# Patient Record
Sex: FEMALE | Race: BLACK OR AFRICAN AMERICAN | Employment: UNEMPLOYED | URBAN - METROPOLITAN AREA
[De-identification: names, ages, dates, MRNs, and addresses within clinical notes are randomized per-mention and may not be internally consistent; named-entity substitution may affect disease eponyms.]

---

## 2021-03-14 ENCOUNTER — HOSPITAL ENCOUNTER (EMERGENCY)
Age: 26
Discharge: HOME | End: 2021-03-14
Attending: EMERGENCY MEDICINE
Payer: COMMERCIAL

## 2021-03-14 ENCOUNTER — APPOINTMENT (OUTPATIENT)
Dept: CT IMAGING | Age: 26
End: 2021-03-14
Attending: EMERGENCY MEDICINE
Payer: COMMERCIAL

## 2021-03-14 VITALS
SYSTOLIC BLOOD PRESSURE: 127 MMHG | HEART RATE: 90 BPM | DIASTOLIC BLOOD PRESSURE: 83 MMHG | BODY MASS INDEX: 29 KG/M2 | RESPIRATION RATE: 16 BRPM | TEMPERATURE: 97.4 F | OXYGEN SATURATION: 100 % | HEIGHT: 67 IN | WEIGHT: 184.75 LBS

## 2021-03-14 DIAGNOSIS — F43.21 GRIEF REACTION: Primary | ICD-10-CM

## 2021-03-14 LAB
ALBUMIN SERPL-MCNC: 3.9 G/DL (ref 3.5–5)
ALBUMIN/GLOB SERPL: 1 {RATIO} (ref 1.1–2.2)
ALP SERPL-CCNC: 78 U/L (ref 45–117)
ALT SERPL-CCNC: 14 U/L (ref 12–78)
AMPHET UR QL SCN: NEGATIVE
ANION GAP SERPL CALC-SCNC: 3 MMOL/L (ref 5–15)
APPEARANCE UR: CLEAR
AST SERPL-CCNC: 5 U/L (ref 15–37)
BACTERIA URNS QL MICRO: NEGATIVE /HPF
BARBITURATES UR QL SCN: NEGATIVE
BASOPHILS # BLD: 0 K/UL (ref 0–0.1)
BASOPHILS NFR BLD: 0 % (ref 0–1)
BENZODIAZ UR QL: NEGATIVE
BILIRUB SERPL-MCNC: 0.7 MG/DL (ref 0.2–1)
BILIRUB UR QL: NEGATIVE
BUN SERPL-MCNC: 7 MG/DL (ref 6–20)
BUN/CREAT SERPL: 9 (ref 12–20)
CALCIUM SERPL-MCNC: 9.5 MG/DL (ref 8.5–10.1)
CANNABINOIDS UR QL SCN: NEGATIVE
CHLORIDE SERPL-SCNC: 106 MMOL/L (ref 97–108)
CO2 SERPL-SCNC: 29 MMOL/L (ref 21–32)
COCAINE UR QL SCN: NEGATIVE
COLOR UR: ABNORMAL
COMMENT, HOLDF: NORMAL
CREAT SERPL-MCNC: 0.8 MG/DL (ref 0.55–1.02)
DIFFERENTIAL METHOD BLD: NORMAL
DRUG SCRN COMMENT,DRGCM: NORMAL
EOSINOPHIL # BLD: 0 K/UL (ref 0–0.4)
EOSINOPHIL NFR BLD: 1 % (ref 0–7)
EPITH CASTS URNS QL MICRO: ABNORMAL /LPF
ERYTHROCYTE [DISTWIDTH] IN BLOOD BY AUTOMATED COUNT: 11.9 % (ref 11.5–14.5)
ETHANOL SERPL-MCNC: <10 MG/DL
GLOBULIN SER CALC-MCNC: 3.9 G/DL (ref 2–4)
GLUCOSE SERPL-MCNC: 79 MG/DL (ref 65–100)
GLUCOSE UR STRIP.AUTO-MCNC: NEGATIVE MG/DL
HCG SERPL QL: NEGATIVE
HCT VFR BLD AUTO: 37.5 % (ref 35–47)
HGB BLD-MCNC: 12.8 G/DL (ref 11.5–16)
HGB UR QL STRIP: NEGATIVE
HYALINE CASTS URNS QL MICRO: ABNORMAL /LPF (ref 0–5)
IMM GRANULOCYTES # BLD AUTO: 0 K/UL (ref 0–0.04)
IMM GRANULOCYTES NFR BLD AUTO: 0 % (ref 0–0.5)
KETONES UR QL STRIP.AUTO: 80 MG/DL
LEUKOCYTE ESTERASE UR QL STRIP.AUTO: NEGATIVE
LYMPHOCYTES # BLD: 1.9 K/UL (ref 0.8–3.5)
LYMPHOCYTES NFR BLD: 24 % (ref 12–49)
MCH RBC QN AUTO: 30.5 PG (ref 26–34)
MCHC RBC AUTO-ENTMCNC: 34.1 G/DL (ref 30–36.5)
MCV RBC AUTO: 89.5 FL (ref 80–99)
METHADONE UR QL: NEGATIVE
MONOCYTES # BLD: 0.8 K/UL (ref 0–1)
MONOCYTES NFR BLD: 10 % (ref 5–13)
NEUTS SEG # BLD: 5.2 K/UL (ref 1.8–8)
NEUTS SEG NFR BLD: 65 % (ref 32–75)
NITRITE UR QL STRIP.AUTO: NEGATIVE
NRBC # BLD: 0 K/UL (ref 0–0.01)
NRBC BLD-RTO: 0 PER 100 WBC
OPIATES UR QL: NEGATIVE
PCP UR QL: NEGATIVE
PH UR STRIP: 6 [PH] (ref 5–8)
PLATELET # BLD AUTO: 296 K/UL (ref 150–400)
PMV BLD AUTO: 9.7 FL (ref 8.9–12.9)
POTASSIUM SERPL-SCNC: 4.5 MMOL/L (ref 3.5–5.1)
PROT SERPL-MCNC: 7.8 G/DL (ref 6.4–8.2)
PROT UR STRIP-MCNC: 100 MG/DL
RBC # BLD AUTO: 4.19 M/UL (ref 3.8–5.2)
RBC #/AREA URNS HPF: ABNORMAL /HPF (ref 0–5)
SAMPLES BEING HELD,HOLD: NORMAL
SODIUM SERPL-SCNC: 138 MMOL/L (ref 136–145)
SP GR UR REFRACTOMETRY: 1.02 (ref 1–1.03)
TSH SERPL DL<=0.05 MIU/L-ACNC: 0.84 UIU/ML (ref 0.36–3.74)
UA: UC IF INDICATED,UAUC: ABNORMAL
UROBILINOGEN UR QL STRIP.AUTO: 1 EU/DL (ref 0.2–1)
WBC # BLD AUTO: 7.9 K/UL (ref 3.6–11)
WBC URNS QL MICRO: ABNORMAL /HPF (ref 0–4)

## 2021-03-14 PROCEDURE — 90791 PSYCH DIAGNOSTIC EVALUATION: CPT

## 2021-03-14 PROCEDURE — 82077 ASSAY SPEC XCP UR&BREATH IA: CPT

## 2021-03-14 PROCEDURE — 51701 INSERT BLADDER CATHETER: CPT

## 2021-03-14 PROCEDURE — 81001 URINALYSIS AUTO W/SCOPE: CPT

## 2021-03-14 PROCEDURE — 84703 CHORIONIC GONADOTROPIN ASSAY: CPT

## 2021-03-14 PROCEDURE — 80053 COMPREHEN METABOLIC PANEL: CPT

## 2021-03-14 PROCEDURE — 70450 CT HEAD/BRAIN W/O DYE: CPT

## 2021-03-14 PROCEDURE — 80307 DRUG TEST PRSMV CHEM ANLYZR: CPT

## 2021-03-14 PROCEDURE — 36415 COLL VENOUS BLD VENIPUNCTURE: CPT

## 2021-03-14 PROCEDURE — 84443 ASSAY THYROID STIM HORMONE: CPT

## 2021-03-14 PROCEDURE — 85025 COMPLETE CBC W/AUTO DIFF WBC: CPT

## 2021-03-14 PROCEDURE — 99284 EMERGENCY DEPT VISIT MOD MDM: CPT

## 2021-03-14 NOTE — ED PROVIDER NOTES
EMERGENCY DEPARTMENT HISTORY AND PHYSICAL EXAM      Date: 3/14/2021  Patient Name: Kole Cohn    History of Presenting Illness     Chief Complaint   Patient presents with    Urinary Pain     mother with her she thinks she has a urinary tract infection; no appetite and odor with urine; pt recently taken off zoloft.  Mental Health Problem     pt has new allergies to medicatons for depression; pt is not speaking in triage; History Provided By: Patient's Mother    HPI: Kole Cohn, 31 y.o. female with PMHx significant for anxiety, depression, who presents with a chief complaint of worsening depression as well as concerns for UTI and malodorous urine. Patient's mother reports that the patient has been on escitalopram and sertraline previously for her symptoms but has had \"reactions. \"  Mother describes these as insomnia. She stopped taking Zoloft this past week and since then has had intermittent periods that are getting more frequent where she refuses to speak. She is not eating or drinking. On my evaluation, patient is alert but refusing to answer any questions or follow commands. Additional history review of systems are limited by psychiatric condition. PCP: Unknown, Provider        Past History     Past Medical History:  Past Medical History:   Diagnosis Date    Psychiatric disorder     anxiety, depression     Past Surgical History:  History reviewed. No pertinent surgical history. Family History:  History reviewed. No pertinent family history. Social History:  Social History     Tobacco Use    Smoking status: Never Smoker   Substance Use Topics    Alcohol use: Not Currently    Drug use: Never     Allergies:   Allergies   Allergen Reactions    Escitalopram Other (comments)     Muscle pain and heart palpitations    Zoloft [Sertraline] Other (comments)     insomnia     Review of Systems   Review of Systems   Unable to perform ROS: Psychiatric disorder     Physical Exam   Physical Exam  Vitals signs and nursing note reviewed. Constitutional:       General: She is not in acute distress. Appearance: She is well-developed. HENT:      Head: Normocephalic and atraumatic. Eyes:      Conjunctiva/sclera: Conjunctivae normal.      Pupils: Pupils are equal, round, and reactive to light. Neck:      Musculoskeletal: Normal range of motion. Cardiovascular:      Rate and Rhythm: Normal rate and regular rhythm. Pulmonary:      Effort: Pulmonary effort is normal. No respiratory distress. Breath sounds: Normal breath sounds. No stridor. Abdominal:      General: There is no distension. Palpations: Abdomen is soft. Tenderness: There is no abdominal tenderness. Musculoskeletal: Normal range of motion. Skin:     General: Skin is warm and dry. Neurological:      Mental Status: She is alert. Comments: Not speaking  Refusing to follow any commands  Ambulated to her room in the ED       Diagnostic Study Results   Labs -     Recent Results (from the past 12 hour(s))   CBC WITH AUTOMATED DIFF    Collection Time: 03/14/21 12:17 PM   Result Value Ref Range    WBC 7.9 3.6 - 11.0 K/uL    RBC 4.19 3.80 - 5.20 M/uL    HGB 12.8 11.5 - 16.0 g/dL    HCT 37.5 35.0 - 47.0 %    MCV 89.5 80.0 - 99.0 FL    MCH 30.5 26.0 - 34.0 PG    MCHC 34.1 30.0 - 36.5 g/dL    RDW 11.9 11.5 - 14.5 %    PLATELET 423 739 - 411 K/uL    MPV 9.7 8.9 - 12.9 FL    NRBC 0.0 0  WBC    ABSOLUTE NRBC 0.00 0.00 - 0.01 K/uL    NEUTROPHILS 65 32 - 75 %    LYMPHOCYTES 24 12 - 49 %    MONOCYTES 10 5 - 13 %    EOSINOPHILS 1 0 - 7 %    BASOPHILS 0 0 - 1 %    IMMATURE GRANULOCYTES 0 0.0 - 0.5 %    ABS. NEUTROPHILS 5.2 1.8 - 8.0 K/UL    ABS. LYMPHOCYTES 1.9 0.8 - 3.5 K/UL    ABS. MONOCYTES 0.8 0.0 - 1.0 K/UL    ABS. EOSINOPHILS 0.0 0.0 - 0.4 K/UL    ABS. BASOPHILS 0.0 0.0 - 0.1 K/UL    ABS. IMM.  GRANS. 0.0 0.00 - 0.04 K/UL    DF AUTOMATED     METABOLIC PANEL, COMPREHENSIVE    Collection Time: 03/14/21 12:17 PM Result Value Ref Range    Sodium 138 136 - 145 mmol/L    Potassium 4.5 3.5 - 5.1 mmol/L    Chloride 106 97 - 108 mmol/L    CO2 29 21 - 32 mmol/L    Anion gap 3 (L) 5 - 15 mmol/L    Glucose 79 65 - 100 mg/dL    BUN 7 6 - 20 MG/DL    Creatinine 0.80 0.55 - 1.02 MG/DL    BUN/Creatinine ratio 9 (L) 12 - 20      GFR est AA >60 >60 ml/min/1.73m2    GFR est non-AA >60 >60 ml/min/1.73m2    Calcium 9.5 8.5 - 10.1 MG/DL    Bilirubin, total 0.7 0.2 - 1.0 MG/DL    ALT (SGPT) 14 12 - 78 U/L    AST (SGOT) 5 (L) 15 - 37 U/L    Alk. phosphatase 78 45 - 117 U/L    Protein, total 7.8 6.4 - 8.2 g/dL    Albumin 3.9 3.5 - 5.0 g/dL    Globulin 3.9 2.0 - 4.0 g/dL    A-G Ratio 1.0 (L) 1.1 - 2.2     ETHYL ALCOHOL    Collection Time: 03/14/21 12:17 PM   Result Value Ref Range    ALCOHOL(ETHYL),SERUM <10 <10 MG/DL   SAMPLES BEING HELD    Collection Time: 03/14/21 12:17 PM   Result Value Ref Range    SAMPLES BEING HELD PST     COMMENT        Add-on orders for these samples will be processed based on acceptable specimen integrity and analyte stability, which may vary by analyte.    TSH 3RD GENERATION    Collection Time: 03/14/21 12:17 PM   Result Value Ref Range    TSH 0.84 0.36 - 3.74 uIU/mL   HCG QL SERUM    Collection Time: 03/14/21 12:17 PM   Result Value Ref Range    HCG, Ql. Negative NEG     URINALYSIS W/ REFLEX CULTURE    Collection Time: 03/14/21  3:57 PM    Specimen: Urine   Result Value Ref Range    Color YELLOW/STRAW      Appearance CLEAR CLEAR      Specific gravity 1.025 1.003 - 1.030      pH (UA) 6.0 5.0 - 8.0      Protein 100 (A) NEG mg/dL    Glucose Negative NEG mg/dL    Ketone 80 (A) NEG mg/dL    Bilirubin Negative NEG      Blood Negative NEG      Urobilinogen 1.0 0.2 - 1.0 EU/dL    Nitrites Negative NEG      Leukocyte Esterase Negative NEG      WBC 0-4 0 - 4 /hpf    RBC 0-5 0 - 5 /hpf    Epithelial cells FEW FEW /lpf    Bacteria Negative NEG /hpf    UA:UC IF INDICATED CULTURE NOT INDICATED BY UA RESULT CNI      Hyaline cast 0-2 0 - 5 /lpf   DRUG SCREEN, URINE    Collection Time: 03/14/21  3:57 PM   Result Value Ref Range    AMPHETAMINES Negative NEG      BARBITURATES Negative NEG      BENZODIAZEPINES Negative NEG      COCAINE Negative NEG      METHADONE Negative NEG      OPIATES Negative NEG      PCP(PHENCYCLIDINE) Negative NEG      THC (TH-CANNABINOL) Negative NEG      Drug screen comment (NOTE)        Radiologic Studies -   CT HEAD WO CONT   Final Result   No acute abnormality. Ct Head Wo Cont    Result Date: 3/14/2021  No acute abnormality. Medical Decision Making   I am the first provider for this patient. I reviewed the vital signs, available nursing notes, past medical history, past surgical history, family history and social history. Vital Signs-Reviewed the patient's vital signs. Patient Vitals for the past 12 hrs:   Temp Pulse Resp BP SpO2   03/14/21 1300    127/83 100 %   03/14/21 1230    128/82 100 %   03/14/21 1215    125/83 100 %   03/14/21 1200    121/75 100 %   03/14/21 1145    125/83 100 %   03/14/21 1051 97.4 °F (36.3 °C) 90 16 113/82 100 %       Pulse Oximetry Analysis - 100% on ra    Records Reviewed: Nursing Notes and Old Medical Records    Provider Notes (Medical Decision Making):   Patient presents with concerns for UTI, depression. Currently not speaking but hemodynamically stable. Was ambulatory into the emergency department. I suspect the reason for her lack of speech is related to significant depression. Will check basic lab work, urinalysis, urine drug screen, CT head. Will have [facility] see the patient    ED Course:   Initial assessment performed. The patients presenting problems have been discussed, and they are in agreement with the care plan formulated and outlined with them. I have encouraged them to ask questions as they arise throughout their visit. ED Course as of Mar 14 1740   David Sernaирина Mar 14, 2021   1717 Mom would like to take patient home. Safety plan made. Aliyah has seen. Will d/c    [SADI]      ED Course User Index  Hailey Galindo MD     Mom states that she is able to get the patient to eat and take pills. They are hoping that she will \"snap out of it. \"  Myself as well as Luis Eduardo Cuenca from [place] discussed that the patient would likely benefit from inpatient hospitalization. Mom states that she would like to take her home at this time and if she does not improve in the next 2 days she will bring her back to the hospital.    Procedures:  Procedures    Critical Care:  none    Disposition:  discharge    PLAN:  1. There are no discharge medications for this patient. 2.   Follow-up Information     Follow up With Specialties Details Why Contact Info    your PCP  Schedule an appointment as soon as possible for a visit       [facility] EMERGENCY DEPT Emergency Medicine  As needed, If symptoms worsen [address]  [address] [facility]  [phone]        Return to ED if worse     Diagnosis     Clinical Impression:   1. Grief reaction            Please note that this dictation was completed with [employer], the computer voice recognition software. Quite often unanticipated grammatical, syntax, homophones, and other interpretive errors are inadvertently transcribed by the computer software. Please disregard these errors.   Please excuse any errors that have escaped final proofreading

## 2021-03-14 NOTE — ED NOTES
Pt arrived via Private vehicle accompanied by mother. Pt responds to pain but will not answer staff questions or talk. Mother states pt has Mental augustin history with anxiety and the family has recent suffered a lost of 2 family members. Pt was placed on Zoloft and was having insomnia and increased paige when she was taken off last week. Pt's mother also reports pt has recently showed signs of decreased eating and drinking and voiding. Mother states pt had foul smelling urine and she thinks it might be related to the non-verbal behavior. no

## 2021-03-14 NOTE — ED NOTES
Spoke with [employer]. Mom assisted pt to bathroom to attempt to obtain UA. 1540 pt still unable to void. RN straight cathed pt with tech at bedside.

## 2021-03-14 NOTE — [facility] NOTE
Comprehensive Assessment Form Part 1 Section I - Disposition Axis I - Major Depressive Disorder with Psychotic Features, Premenstrual dysphoric disorder (by history) Axis II - Deferred Axis III - None Axis IV - Grief/loss Axis V - 40 The Medical Doctor to Psychiatrist conference was not completed. The Medical Doctor is in agreement with Psychiatrist disposition because of (reason) patient's other is willing to care for her while seeing if outpatient psychiatry can help patient. The plan is discharge and follow up tomorrow with psychiatric NP at [facility]. The on-call Psychiatrist consulted was NP Jumana Solis. The admitting Psychiatrist will be Dr. Raman Anthony. The admitting Diagnosis is NA. The Payor source is self. Section II - Integrated Summary Summary:  Patient is a 31year old female seen face to face in the ER with her mother. Of note, mother provided all information, as patient did not speak at any point during the assessment. Nursing staff reported she had spoken on 2 occasions since being in the ER, saying \"no\" when she felt the needle when they rachael her blood, and saying slightly more in opposition when she was straight cathed for urine. Patient's mother reported that patient has had 3 recent deaths of people she was close with. Her 29year old sister-in-law  in January after a 16 month poe with breast cancer. A week before that a dance teacher of her's passed away at age 29. A month before that a dancer friend of her's  in her sleep at the age of 24. 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 This did not happen due to the COVID-19 pandemic. 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 She has been seeing a therapist and psychiatric NP through [facility]. She took Lexapro for a while, but it caused her to have muscle pain and heart palpitations, so she stopped it.   About a month ago she was put on Zoloft, but after about a week she started having terrible insomnia and almost manic behavior, so she was told to stop it. Patient began sleeping again, but is now not eating or drinking without her mother holding something up to her mouth. She stopped speaking for the most part about 2 days ago. Her mother has been giving her water, juice, and ensure. Patient's mother reported she has not been handling her grief well. She has a history of becoming especially low during her period, and has been diagnosed with PMDD. She reported after he period stopped this last month her mood did not get better like it usually does. She said her mood has been \"low, low\" for the past 2 weeks. Patient presents as staring and catatonic. Her mother is able to get her to follow some directions. Her mother reported she was hospitalized when she was 11 at [facility] because Iva Gilmore was psychotic or something because she cut her hair. \"  She reported this was a very traumatizing situation, as the hospital tried to send her to a program out of state and they had to go to court to stop this from happening. Her mother does not want her hospitalized psychiatrically at this time. She would like to take patient home and call her psychiatric NP at [facility] tomorrow to see if they will prescribe something to try on an outpatient basis. She said if she does not respond by Thursday/Friday or she gets worse she will take her to an ER to be psychiatrically admitted. Patient has no history of suicide attempts or aggression. Her blood work was normal and her mother appears to be doing a good job of making sure she gets some nutrition. Patient's father is home all day and monitors her when her mother is at work. Discussed case with on call NP and she agreed for patient to be discharged to attempt a lesser restrictive option before hospitalizing her. The patient has demonstrated mental capacity to provide informed consent. The information is given by the patient's mother. The Chief Complaint is mental health problem. The Precipitant Factors are multiple recent deaths of young people who she was close to. Previous Hospitalizations: yes The patient has not previously been in restraints. Current Psychiatrist is NP Kurt Paget and therapist's name is also Hanna Mays, both with [address] Lethality Assessment: 
 
The potential for suicide is not noted. The potential for homicide is not noted. The patient has not been a perpetrator of sexual or physical abuse. There are not pending charges. The patient is not felt to be at risk for self harm or harm to others, but is at risk due to inability to care for herself. The attending nurse was advised the patient needs supervision. Section III - Psychosocial 
The patient's overall mood and attitude is withdrawn. Feelings of helplessness and hopelessness are not observed. Generalized anxiety is not observed. Panic is not observed. Phobias are not observed. Obsessive compulsive tendencies are not observed. Section IV - Mental Status Exam 
The patient's appearance shows no evidence of impairment. The patient's behavior shows poor eye contact. The patient is orientation is unknown as patient did not speak. The patient's speech is impoverished. She did not speak at all. The patient's mood is withdrawn. The range of affect is flat. The patient's thought content was unable to be assessed. The thought process was unable to be assessed. The patient's perception was unable to be assessed. The patient's memory was unable to be assessed. The patient's appetite is decreased and shows signs of weight loss. The patient's sleep shows no evidence of impairment. The patient's insight and judgement are unable to be assessed. Section V - Substance Abuse The patient is not using substances. Section VI - Living Arrangements The patient is single.   The patient lives with her parents. The patient has no children. The patient does plan to return home upon discharge. The patient does not have legal issues pending. The patient's source of income comes from family. Scientologist and cultural practices have not been voiced at this time. The patient's greatest support comes from her parents and this person will be involved with the treatment. The patient has been in an event described as horrible or outside the realm of ordinary life experience either currently or in the past. 
The patient has not been a victim of sexual/physical abuse. Section VII - Other Areas of Clinical Concern The highest grade achieved is college graduate with the overall quality of school experience being described as not assessed. The patient is currently unemployed and speaks [place] as a primary language. The patient has no communication impairments affecting communication. The patient's preference for learning can be described as: can read and write adequately.   The patient's hearing is normal.  The patient's vision is normal. 
 
 
Nano Quiroz MA

## 2023-01-01 ENCOUNTER — ANESTHESIA EVENT (OUTPATIENT)
Dept: SURGERY | Age: 28
DRG: 951 | End: 2023-01-01
Payer: COMMERCIAL

## 2023-01-01 ENCOUNTER — APPOINTMENT (OUTPATIENT)
Dept: NON INVASIVE DIAGNOSTICS | Age: 28
DRG: 059 | End: 2023-01-01
Attending: NURSE PRACTITIONER
Payer: MEDICAID

## 2023-01-01 ENCOUNTER — APPOINTMENT (OUTPATIENT)
Dept: CT IMAGING | Age: 28
DRG: 059 | End: 2023-01-01
Payer: MEDICAID

## 2023-01-01 ENCOUNTER — APPOINTMENT (OUTPATIENT)
Dept: NUCLEAR MEDICINE | Age: 28
DRG: 059 | End: 2023-01-01
Attending: HOSPITALIST
Payer: MEDICAID

## 2023-01-01 ENCOUNTER — APPOINTMENT (OUTPATIENT)
Dept: GENERAL RADIOLOGY | Age: 28
DRG: 951 | End: 2023-01-01
Attending: INTERNAL MEDICINE
Payer: COMMERCIAL

## 2023-01-01 ENCOUNTER — APPOINTMENT (OUTPATIENT)
Dept: CT IMAGING | Age: 28
DRG: 059 | End: 2023-01-01
Attending: NURSE PRACTITIONER
Payer: MEDICAID

## 2023-01-01 ENCOUNTER — HOSPITAL ENCOUNTER (INPATIENT)
Age: 28
LOS: 6 days | DRG: 059 | End: 2023-01-17
Attending: STUDENT IN AN ORGANIZED HEALTH CARE EDUCATION/TRAINING PROGRAM | Admitting: HOSPITALIST
Payer: MEDICAID

## 2023-01-01 ENCOUNTER — APPOINTMENT (OUTPATIENT)
Dept: GENERAL RADIOLOGY | Age: 28
DRG: 059 | End: 2023-01-01
Attending: ANESTHESIOLOGY
Payer: MEDICAID

## 2023-01-01 ENCOUNTER — APPOINTMENT (OUTPATIENT)
Dept: CT IMAGING | Age: 28
DRG: 059 | End: 2023-01-01
Attending: STUDENT IN AN ORGANIZED HEALTH CARE EDUCATION/TRAINING PROGRAM
Payer: MEDICAID

## 2023-01-01 ENCOUNTER — HOSPITAL ENCOUNTER (INPATIENT)
Age: 28
LOS: 1 days | DRG: 951 | End: 2023-01-19
Attending: HOSPITALIST | Admitting: INTERNAL MEDICINE
Payer: COMMERCIAL

## 2023-01-01 ENCOUNTER — APPOINTMENT (OUTPATIENT)
Dept: NON INVASIVE DIAGNOSTICS | Age: 28
DRG: 059 | End: 2023-01-01
Attending: ANESTHESIOLOGY
Payer: MEDICAID

## 2023-01-01 ENCOUNTER — APPOINTMENT (OUTPATIENT)
Dept: GENERAL RADIOLOGY | Age: 28
DRG: 951 | End: 2023-01-01
Attending: HOSPITALIST
Payer: COMMERCIAL

## 2023-01-01 ENCOUNTER — APPOINTMENT (OUTPATIENT)
Dept: GENERAL RADIOLOGY | Age: 28
DRG: 059 | End: 2023-01-01
Attending: STUDENT IN AN ORGANIZED HEALTH CARE EDUCATION/TRAINING PROGRAM
Payer: MEDICAID

## 2023-01-01 ENCOUNTER — APPOINTMENT (OUTPATIENT)
Dept: GENERAL RADIOLOGY | Age: 28
DRG: 059 | End: 2023-01-01
Attending: NURSE PRACTITIONER
Payer: MEDICAID

## 2023-01-01 ENCOUNTER — ANESTHESIA (OUTPATIENT)
Dept: SURGERY | Age: 28
DRG: 951 | End: 2023-01-01
Payer: COMMERCIAL

## 2023-01-01 ENCOUNTER — APPOINTMENT (OUTPATIENT)
Dept: ULTRASOUND IMAGING | Age: 28
DRG: 059 | End: 2023-01-01
Attending: NURSE PRACTITIONER
Payer: MEDICAID

## 2023-01-01 ENCOUNTER — HOSPITAL ENCOUNTER (OUTPATIENT)
Age: 28
DRG: 059 | End: 2023-01-01
Attending: HOSPITALIST | Admitting: HOSPITALIST
Payer: MEDICAID

## 2023-01-01 ENCOUNTER — HOSPITAL ENCOUNTER (INPATIENT)
Dept: GENERAL RADIOLOGY | Age: 28
Discharge: HOME | DRG: 059 | End: 2023-01-17
Attending: HOSPITALIST
Payer: MEDICAID

## 2023-01-01 ENCOUNTER — HOSPITAL ENCOUNTER (INPATIENT)
Dept: GENERAL RADIOLOGY | Age: 28
Discharge: HOME | DRG: 059 | End: 2023-01-17
Payer: MEDICAID

## 2023-01-01 ENCOUNTER — APPOINTMENT (OUTPATIENT)
Dept: GENERAL RADIOLOGY | Age: 28
DRG: 059 | End: 2023-01-01
Payer: MEDICAID

## 2023-01-01 ENCOUNTER — HOSPITAL ENCOUNTER (INPATIENT)
Dept: NON INVASIVE DIAGNOSTICS | Age: 28
Discharge: HOME | DRG: 059 | End: 2023-01-17
Attending: HOSPITALIST
Payer: MEDICAID

## 2023-01-01 VITALS
TEMPERATURE: 97.5 F | SYSTOLIC BLOOD PRESSURE: 138 MMHG | RESPIRATION RATE: 16 BRPM | DIASTOLIC BLOOD PRESSURE: 93 MMHG | HEART RATE: 103 BPM | OXYGEN SATURATION: 100 %

## 2023-01-01 VITALS
SYSTOLIC BLOOD PRESSURE: 124 MMHG | WEIGHT: 185 LBS | DIASTOLIC BLOOD PRESSURE: 82 MMHG | BODY MASS INDEX: 29.03 KG/M2 | TEMPERATURE: 97.7 F | RESPIRATION RATE: 16 BRPM | HEIGHT: 67 IN | HEART RATE: 93 BPM | OXYGEN SATURATION: 101 %

## 2023-01-01 VITALS — SYSTOLIC BLOOD PRESSURE: 145 MMHG | WEIGHT: 185 LBS | BODY MASS INDEX: 28.98 KG/M2 | DIASTOLIC BLOOD PRESSURE: 85 MMHG

## 2023-01-01 DIAGNOSIS — I46.9 CARDIAC ARREST (HCC): ICD-10-CM

## 2023-01-01 DIAGNOSIS — G93.40 ENCEPHALOPATHY: Primary | ICD-10-CM

## 2023-01-01 DIAGNOSIS — G93.1 ANOXIC BRAIN INJURY (HCC): ICD-10-CM

## 2023-01-01 DIAGNOSIS — G25.3 MYOCLONUS: ICD-10-CM

## 2023-01-01 DIAGNOSIS — R07.9 CHEST PAIN: ICD-10-CM

## 2023-01-01 LAB
A1 AB SERPL QL: NORMAL
A1 AB SERPL QL: NORMAL
A2 CELLS,A2C: NORMAL
A2 CELLS,A2C: NORMAL
ABO + RH BLD: NORMAL
ADMINISTERED INITIALS, ADMINIT: NORMAL
ALBUMIN SERPL-MCNC: 1.9 G/DL (ref 3.5–5)
ALBUMIN SERPL-MCNC: 2 G/DL (ref 3.5–5)
ALBUMIN SERPL-MCNC: 2.1 G/DL (ref 3.5–5)
ALBUMIN SERPL-MCNC: 2.2 G/DL (ref 3.5–5)
ALBUMIN SERPL-MCNC: 2.4 G/DL (ref 3.5–5)
ALBUMIN SERPL-MCNC: 2.5 G/DL (ref 3.5–5)
ALBUMIN SERPL-MCNC: 2.8 G/DL (ref 3.5–5)
ALBUMIN SERPL-MCNC: 2.8 G/DL (ref 3.5–5)
ALBUMIN SERPL-MCNC: 2.9 G/DL (ref 3.5–5)
ALBUMIN SERPL-MCNC: 3.2 G/DL (ref 3.5–5)
ALBUMIN/GLOB SERPL: 0.7 (ref 1.1–2.2)
ALBUMIN/GLOB SERPL: 0.8 (ref 1.1–2.2)
ALBUMIN/GLOB SERPL: 0.8 (ref 1.1–2.2)
ALBUMIN/GLOB SERPL: 0.9 (ref 1.1–2.2)
ALBUMIN/GLOB SERPL: 1 (ref 1.1–2.2)
ALP SERPL-CCNC: 103 U/L (ref 45–117)
ALP SERPL-CCNC: 108 U/L (ref 45–117)
ALP SERPL-CCNC: 111 U/L (ref 45–117)
ALP SERPL-CCNC: 112 U/L (ref 45–117)
ALP SERPL-CCNC: 133 U/L (ref 45–117)
ALP SERPL-CCNC: 79 U/L (ref 45–117)
ALP SERPL-CCNC: 84 U/L (ref 45–117)
ALP SERPL-CCNC: 87 U/L (ref 45–117)
ALP SERPL-CCNC: 89 U/L (ref 45–117)
ALP SERPL-CCNC: 90 U/L (ref 45–117)
ALP SERPL-CCNC: 92 U/L (ref 45–117)
ALP SERPL-CCNC: 94 U/L (ref 45–117)
ALP SERPL-CCNC: 95 U/L (ref 45–117)
ALP SERPL-CCNC: 95 U/L (ref 45–117)
ALP SERPL-CCNC: 97 U/L (ref 45–117)
ALT SERPL-CCNC: 117 U/L (ref 12–78)
ALT SERPL-CCNC: 118 U/L (ref 12–78)
ALT SERPL-CCNC: 120 U/L (ref 12–78)
ALT SERPL-CCNC: 125 U/L (ref 12–78)
ALT SERPL-CCNC: 138 U/L (ref 12–78)
ALT SERPL-CCNC: 202 U/L (ref 12–78)
ALT SERPL-CCNC: 267 U/L (ref 12–78)
ALT SERPL-CCNC: 450 U/L (ref 12–78)
ALT SERPL-CCNC: 453 U/L (ref 12–78)
ALT SERPL-CCNC: 628 U/L (ref 12–78)
ALT SERPL-CCNC: 674 U/L (ref 12–78)
ALT SERPL-CCNC: 80 U/L (ref 12–78)
ALT SERPL-CCNC: 84 U/L (ref 12–78)
ALT SERPL-CCNC: 97 U/L (ref 12–78)
ALT SERPL-CCNC: 99 U/L (ref 12–78)
AMPHET UR QL SCN: NEGATIVE
ANION GAP SERPL CALC-SCNC: 10 MMOL/L (ref 5–15)
ANION GAP SERPL CALC-SCNC: 11 MMOL/L (ref 5–15)
ANION GAP SERPL CALC-SCNC: 15 MMOL/L (ref 5–15)
ANION GAP SERPL CALC-SCNC: 17 MMOL/L (ref 5–15)
ANION GAP SERPL CALC-SCNC: 25 MMOL/L (ref 5–15)
ANION GAP SERPL CALC-SCNC: 5 MMOL/L (ref 5–15)
ANION GAP SERPL CALC-SCNC: 6 MMOL/L (ref 5–15)
ANION GAP SERPL CALC-SCNC: 7 MMOL/L (ref 5–15)
ANION GAP SERPL CALC-SCNC: 8 MMOL/L (ref 5–15)
ANION GAP SERPL CALC-SCNC: 9 MMOL/L (ref 5–15)
APAP SERPL-MCNC: <2 UG/ML (ref 10–30)
APPEARANCE FLD: ABNORMAL
APPEARANCE UR: ABNORMAL
APPEARANCE UR: CLEAR
APPEARANCE UR: CLEAR
APTT PPP: 27.5 SEC (ref 22.1–31)
APTT PPP: 28.9 SEC (ref 22.1–31)
APTT PPP: 31.1 SEC (ref 22.1–31)
APTT PPP: 31.8 SEC (ref 22.1–31)
APTT PPP: 32.5 SEC (ref 22.1–31)
APTT PPP: 33.2 SEC (ref 22.1–31)
APTT PPP: 35 SEC (ref 22.1–31)
ARTERIAL PATENCY WRIST A: ABNORMAL
ARTERIAL PATENCY WRIST A: POSITIVE
AST SERPL-CCNC: 116 U/L (ref 15–37)
AST SERPL-CCNC: 134 U/L (ref 15–37)
AST SERPL-CCNC: 147 U/L (ref 15–37)
AST SERPL-CCNC: 280 U/L (ref 15–37)
AST SERPL-CCNC: 464 U/L (ref 15–37)
AST SERPL-CCNC: 495 U/L (ref 15–37)
AST SERPL-CCNC: 807 U/L (ref 15–37)
AST SERPL-CCNC: 808 U/L (ref 15–37)
AST SERPL-CCNC: 84 U/L (ref 15–37)
AST SERPL-CCNC: 85 U/L (ref 15–37)
AST SERPL-CCNC: 90 U/L (ref 15–37)
AST SERPL-CCNC: 92 U/L (ref 15–37)
AST SERPL-CCNC: 94 U/L (ref 15–37)
AST SERPL-CCNC: 96 U/L (ref 15–37)
AST SERPL-CCNC: 98 U/L (ref 15–37)
ATRIAL RATE: 119 BPM
ATRIAL RATE: 120 BPM
ATRIAL RATE: 134 BPM
ATRIAL RATE: 137 BPM
BACTERIA SPEC CULT: NORMAL
BARBITURATES UR QL SCN: NEGATIVE
BASE DEFICIT BLD-SCNC: 0.9 MMOL/L
BASE DEFICIT BLD-SCNC: 17.5 MMOL/L
BASE DEFICIT BLD-SCNC: 2.1 MMOL/L
BASE DEFICIT BLD-SCNC: 2.7 MMOL/L
BASE DEFICIT BLD-SCNC: 2.9 MMOL/L
BASE DEFICIT BLD-SCNC: 4.1 MMOL/L
BASE DEFICIT BLD-SCNC: 4.7 MMOL/L
BASE DEFICIT BLD-SCNC: 4.7 MMOL/L
BASE DEFICIT BLD-SCNC: 5 MMOL/L
BASE DEFICIT BLDA-SCNC: 1.6 MMOL/L
BASE DEFICIT BLDA-SCNC: 3.8 MMOL/L
BASE DEFICIT BLDA-SCNC: 4.2 MMOL/L
BASE DEFICIT BLDA-SCNC: 5.4 MMOL/L
BASE DEFICIT BLDA-SCNC: 6.1 MMOL/L
BASE DEFICIT BLDA-SCNC: 7.4 MMOL/L
BASE DEFICIT BLDA-SCNC: 8.4 MMOL/L
BASE DEFICIT BLDA-SCNC: 8.5 MMOL/L
BASE DEFICIT BLDV-SCNC: 10.1 MMOL/L
BASE DEFICIT BLDV-SCNC: 2.7 MMOL/L
BASOPHILS # BLD: 0 K/UL (ref 0–0.1)
BASOPHILS NFR BLD: 0 % (ref 0–1)
BDY SITE: ABNORMAL
BENZODIAZ UR QL: NEGATIVE
BILIRUB DIRECT SERPL-MCNC: 0.2 MG/DL (ref 0–0.2)
BILIRUB SERPL-MCNC: 0.4 MG/DL (ref 0.2–1)
BILIRUB SERPL-MCNC: 0.5 MG/DL (ref 0.2–1)
BILIRUB SERPL-MCNC: 0.6 MG/DL (ref 0.2–1)
BILIRUB SERPL-MCNC: 0.6 MG/DL (ref 0.2–1)
BILIRUB SERPL-MCNC: 0.7 MG/DL (ref 0.2–1)
BILIRUB SERPL-MCNC: 0.7 MG/DL (ref 0.2–1)
BILIRUB SERPL-MCNC: 0.8 MG/DL (ref 0.2–1)
BILIRUB SERPL-MCNC: 0.9 MG/DL (ref 0.2–1)
BILIRUB SERPL-MCNC: 1 MG/DL (ref 0.2–1)
BILIRUB SERPL-MCNC: 1.4 MG/DL (ref 0.2–1)
BILIRUB UR QL: NEGATIVE
BLD PROD TYP BPU: NORMAL
BLOOD GROUP ANTIBODIES SERPL: NORMAL
BLOOD GROUP ANTIBODIES SERPL: NORMAL
BNP SERPL-MCNC: 168 PG/ML
BNP SERPL-MCNC: 19 PG/ML
BPU ID: NORMAL
BUN SERPL-MCNC: 10 MG/DL (ref 6–20)
BUN SERPL-MCNC: 11 MG/DL (ref 6–20)
BUN SERPL-MCNC: 11 MG/DL (ref 6–20)
BUN SERPL-MCNC: 13 MG/DL (ref 6–20)
BUN SERPL-MCNC: 14 MG/DL (ref 6–20)
BUN SERPL-MCNC: 14 MG/DL (ref 6–20)
BUN SERPL-MCNC: 16 MG/DL (ref 6–20)
BUN SERPL-MCNC: 16 MG/DL (ref 6–20)
BUN SERPL-MCNC: 18 MG/DL (ref 6–20)
BUN SERPL-MCNC: 19 MG/DL (ref 6–20)
BUN SERPL-MCNC: 2 MG/DL (ref 6–20)
BUN SERPL-MCNC: 2 MG/DL (ref 6–20)
BUN SERPL-MCNC: 21 MG/DL (ref 6–20)
BUN SERPL-MCNC: 21 MG/DL (ref 6–20)
BUN SERPL-MCNC: 22 MG/DL (ref 6–20)
BUN SERPL-MCNC: 22 MG/DL (ref 6–20)
BUN SERPL-MCNC: 24 MG/DL (ref 6–20)
BUN SERPL-MCNC: 24 MG/DL (ref 6–20)
BUN SERPL-MCNC: 3 MG/DL (ref 6–20)
BUN SERPL-MCNC: 4 MG/DL (ref 6–20)
BUN SERPL-MCNC: 5 MG/DL (ref 6–20)
BUN SERPL-MCNC: 5 MG/DL (ref 6–20)
BUN SERPL-MCNC: 7 MG/DL (ref 6–20)
BUN SERPL-MCNC: 8 MG/DL (ref 6–20)
BUN SERPL-MCNC: 9 MG/DL (ref 6–20)
BUN/CREAT SERPL: 10 (ref 12–20)
BUN/CREAT SERPL: 11 (ref 12–20)
BUN/CREAT SERPL: 12 (ref 12–20)
BUN/CREAT SERPL: 13 (ref 12–20)
BUN/CREAT SERPL: 14 (ref 12–20)
BUN/CREAT SERPL: 15 (ref 12–20)
BUN/CREAT SERPL: 16 (ref 12–20)
BUN/CREAT SERPL: 2 (ref 12–20)
BUN/CREAT SERPL: 2 (ref 12–20)
BUN/CREAT SERPL: 20 (ref 12–20)
BUN/CREAT SERPL: 22 (ref 12–20)
BUN/CREAT SERPL: 3 (ref 12–20)
BUN/CREAT SERPL: 4 (ref 12–20)
BUN/CREAT SERPL: 5 (ref 12–20)
BUN/CREAT SERPL: 6 (ref 12–20)
BUN/CREAT SERPL: 7 (ref 12–20)
BUN/CREAT SERPL: 8 (ref 12–20)
BUN/CREAT SERPL: 9 (ref 12–20)
BUN/CREAT SERPL: 9 (ref 12–20)
CA-I BLD-MCNC: 2.42 MMOL/L (ref 1.12–1.32)
CALCIUM SERPL-MCNC: 6.4 MG/DL (ref 8.5–10.1)
CALCIUM SERPL-MCNC: 6.6 MG/DL (ref 8.5–10.1)
CALCIUM SERPL-MCNC: 7 MG/DL (ref 8.5–10.1)
CALCIUM SERPL-MCNC: 7 MG/DL (ref 8.5–10.1)
CALCIUM SERPL-MCNC: 7.1 MG/DL (ref 8.5–10.1)
CALCIUM SERPL-MCNC: 7.2 MG/DL (ref 8.5–10.1)
CALCIUM SERPL-MCNC: 7.4 MG/DL (ref 8.5–10.1)
CALCIUM SERPL-MCNC: 7.5 MG/DL (ref 8.5–10.1)
CALCIUM SERPL-MCNC: 7.6 MG/DL (ref 8.5–10.1)
CALCIUM SERPL-MCNC: 7.7 MG/DL (ref 8.5–10.1)
CALCIUM SERPL-MCNC: 7.9 MG/DL (ref 8.5–10.1)
CALCIUM SERPL-MCNC: 8 MG/DL (ref 8.5–10.1)
CALCIUM SERPL-MCNC: 8.1 MG/DL (ref 8.5–10.1)
CALCIUM SERPL-MCNC: 8.2 MG/DL (ref 8.5–10.1)
CALCIUM SERPL-MCNC: 8.2 MG/DL (ref 8.5–10.1)
CALCIUM SERPL-MCNC: 8.3 MG/DL (ref 8.5–10.1)
CALCIUM SERPL-MCNC: 8.3 MG/DL (ref 8.5–10.1)
CALCIUM SERPL-MCNC: 8.4 MG/DL (ref 8.5–10.1)
CALCIUM SERPL-MCNC: 8.4 MG/DL (ref 8.5–10.1)
CALCIUM SERPL-MCNC: 8.5 MG/DL (ref 8.5–10.1)
CALCIUM SERPL-MCNC: 8.6 MG/DL (ref 8.5–10.1)
CALCULATED P AXIS, ECG09: 60 DEGREES
CALCULATED P AXIS, ECG09: 66 DEGREES
CALCULATED P AXIS, ECG09: 67 DEGREES
CALCULATED P AXIS, ECG09: 68 DEGREES
CALCULATED R AXIS, ECG10: 60 DEGREES
CALCULATED R AXIS, ECG10: 61 DEGREES
CALCULATED R AXIS, ECG10: 64 DEGREES
CALCULATED R AXIS, ECG10: 64 DEGREES
CALCULATED T AXIS, ECG11: 31 DEGREES
CALCULATED T AXIS, ECG11: 40 DEGREES
CALCULATED T AXIS, ECG11: 51 DEGREES
CALCULATED T AXIS, ECG11: 52 DEGREES
CANNABINOIDS UR QL SCN: NEGATIVE
CHLORIDE BLD-SCNC: 122 MMOL/L (ref 100–108)
CHLORIDE SERPL-SCNC: 108 MMOL/L (ref 97–108)
CHLORIDE SERPL-SCNC: 108 MMOL/L (ref 97–108)
CHLORIDE SERPL-SCNC: 109 MMOL/L (ref 97–108)
CHLORIDE SERPL-SCNC: 109 MMOL/L (ref 97–108)
CHLORIDE SERPL-SCNC: 110 MMOL/L (ref 97–108)
CHLORIDE SERPL-SCNC: 111 MMOL/L (ref 97–108)
CHLORIDE SERPL-SCNC: 112 MMOL/L (ref 97–108)
CHLORIDE SERPL-SCNC: 112 MMOL/L (ref 97–108)
CHLORIDE SERPL-SCNC: 115 MMOL/L (ref 97–108)
CHLORIDE SERPL-SCNC: 116 MMOL/L (ref 97–108)
CHLORIDE SERPL-SCNC: 116 MMOL/L (ref 97–108)
CHLORIDE SERPL-SCNC: 117 MMOL/L (ref 97–108)
CHLORIDE SERPL-SCNC: 118 MMOL/L (ref 97–108)
CHLORIDE SERPL-SCNC: 120 MMOL/L (ref 97–108)
CHLORIDE SERPL-SCNC: 121 MMOL/L (ref 97–108)
CHLORIDE SERPL-SCNC: 123 MMOL/L (ref 97–108)
CHLORIDE SERPL-SCNC: 124 MMOL/L (ref 97–108)
CHLORIDE SERPL-SCNC: 126 MMOL/L (ref 97–108)
CHLORIDE SERPL-SCNC: 129 MMOL/L (ref 97–108)
CHLORIDE SERPL-SCNC: 138 MMOL/L (ref 97–108)
CHLORIDE SERPL-SCNC: 142 MMOL/L (ref 97–108)
CHLORIDE SERPL-SCNC: 143 MMOL/L (ref 97–108)
CHLORIDE SERPL-SCNC: 145 MMOL/L (ref 97–108)
CHLORIDE SERPL-SCNC: 146 MMOL/L (ref 97–108)
CHOLEST SERPL-MCNC: 168 MG/DL
CK SERPL-CCNC: 225 U/L (ref 26–192)
CK SERPL-CCNC: 241 U/L (ref 26–192)
CK SERPL-CCNC: 310 U/L (ref 26–192)
CK SERPL-CCNC: 315 U/L (ref 26–192)
CK SERPL-CCNC: 321 U/L (ref 26–192)
CK SERPL-CCNC: 381 U/L (ref 26–192)
CK SERPL-CCNC: 414 U/L (ref 26–192)
CK SERPL-CCNC: 8708 U/L (ref 26–192)
CO2 SERPL-SCNC: 10 MMOL/L (ref 21–32)
CO2 SERPL-SCNC: 17 MMOL/L (ref 21–32)
CO2 SERPL-SCNC: 20 MMOL/L (ref 21–32)
CO2 SERPL-SCNC: 21 MMOL/L (ref 21–32)
CO2 SERPL-SCNC: 22 MMOL/L (ref 21–32)
CO2 SERPL-SCNC: 23 MMOL/L (ref 21–32)
CO2 SERPL-SCNC: 24 MMOL/L (ref 21–32)
COCAINE UR QL SCN: NEGATIVE
COHGB MFR BLD: 0.3 % (ref 1–2)
COLOR FLD: YELLOW
COLOR UR: ABNORMAL
COLOR UR: ABNORMAL
COLOR UR: NORMAL
COMMENT, HOLDF: NORMAL
COMMENT, HOLDF: NORMAL
COVID-19 RAPID TEST, COVR: NOT DETECTED
CREAT SERPL-MCNC: 0.69 MG/DL (ref 0.55–1.02)
CREAT SERPL-MCNC: 0.73 MG/DL (ref 0.55–1.02)
CREAT SERPL-MCNC: 0.85 MG/DL (ref 0.55–1.02)
CREAT SERPL-MCNC: 0.87 MG/DL (ref 0.55–1.02)
CREAT SERPL-MCNC: 0.89 MG/DL (ref 0.55–1.02)
CREAT SERPL-MCNC: 0.93 MG/DL (ref 0.55–1.02)
CREAT SERPL-MCNC: 0.95 MG/DL (ref 0.55–1.02)
CREAT SERPL-MCNC: 0.97 MG/DL (ref 0.55–1.02)
CREAT SERPL-MCNC: 0.98 MG/DL (ref 0.55–1.02)
CREAT SERPL-MCNC: 0.98 MG/DL (ref 0.55–1.02)
CREAT SERPL-MCNC: 1 MG/DL (ref 0.55–1.02)
CREAT SERPL-MCNC: 1.03 MG/DL (ref 0.55–1.02)
CREAT SERPL-MCNC: 1.15 MG/DL (ref 0.55–1.02)
CREAT SERPL-MCNC: 1.21 MG/DL (ref 0.55–1.02)
CREAT SERPL-MCNC: 1.23 MG/DL (ref 0.55–1.02)
CREAT SERPL-MCNC: 1.3 MG/DL (ref 0.55–1.02)
CREAT SERPL-MCNC: 1.37 MG/DL (ref 0.55–1.02)
CREAT SERPL-MCNC: 1.38 MG/DL (ref 0.55–1.02)
CREAT SERPL-MCNC: 1.48 MG/DL (ref 0.55–1.02)
CREAT SERPL-MCNC: 1.53 MG/DL (ref 0.55–1.02)
CREAT SERPL-MCNC: 1.53 MG/DL (ref 0.55–1.02)
CREAT SERPL-MCNC: 1.54 MG/DL (ref 0.55–1.02)
CREAT SERPL-MCNC: 1.57 MG/DL (ref 0.55–1.02)
CREAT SERPL-MCNC: 1.58 MG/DL (ref 0.55–1.02)
CREAT SERPL-MCNC: 1.93 MG/DL (ref 0.55–1.02)
CREAT UR-MCNC: 1.8 MG/DL (ref 0.6–1.3)
CROSSMATCH RESULT,%XM: NORMAL
D50 ADMINISTERED, D50ADM: 0 ML
D50 ORDER, D50ORD: 0 ML
DATE LAST DOSE: ABNORMAL
DATE LAST DOSE: ABNORMAL
DATE LAST DOSE: NORMAL
DIAGNOSIS, 93000: NORMAL
DIFFERENTIAL METHOD BLD: ABNORMAL
DRUG SCRN COMMENT,DRGCM: NORMAL
ECHO AO ROOT DIAM: 2.5 CM
ECHO AV MEAN GRADIENT: 4 MMHG
ECHO AV MEAN GRADIENT: 9 MMHG
ECHO AV MEAN VELOCITY: 0.9 M/S
ECHO AV MEAN VELOCITY: 1.5 M/S
ECHO AV PEAK GRADIENT: 17 MMHG
ECHO AV PEAK GRADIENT: 8 MMHG
ECHO AV PEAK VELOCITY: 1.4 M/S
ECHO AV PEAK VELOCITY: 2.1 M/S
ECHO AV VTI: 18.2 CM
ECHO AV VTI: 30.2 CM
ECHO LA DIAMETER: 2.7 CM
ECHO LA TO AORTIC ROOT RATIO: 1.08
ECHO LV FRACTIONAL SHORTENING: 33 % (ref 28–44)
ECHO LV INTERNAL DIMENSION DIASTOLIC: 3.6 CM (ref 3.9–5.3)
ECHO LV INTERNAL DIMENSION SYSTOLIC: 2.4 CM
ECHO LV IVSD: 1 CM (ref 0.6–0.9)
ECHO LV MASS 2D: 107.9 G (ref 67–162)
ECHO LV POSTERIOR WALL DIASTOLIC: 1 CM (ref 0.6–0.9)
ECHO LV RELATIVE WALL THICKNESS RATIO: 0.56
ECHO LVOT AREA: 1.5 CM2
ECHO LVOT DIAM: 1.4 CM
ECHO MV MAX VELOCITY: 1.2 M/S
ECHO MV MEAN GRADIENT: 4 MMHG
ECHO MV MEAN VELOCITY: 1 M/S
ECHO MV PEAK GRADIENT: 6 MMHG
ECHO MV VTI: 19.1 CM
ECHO RV INTERNAL DIMENSION: 3.6 CM
ECHO TV REGURGITANT MAX VELOCITY: 1.08 M/S
ECHO TV REGURGITANT PEAK GRADIENT: 5 MMHG
EOSINOPHIL # BLD: 0 K/UL (ref 0–0.4)
EOSINOPHIL # BLD: 0.1 K/UL (ref 0–0.4)
EOSINOPHIL # BLD: 0.1 K/UL (ref 0–0.4)
EOSINOPHIL # BLD: 0.5 K/UL (ref 0–0.4)
EOSINOPHIL NFR BLD: 0 % (ref 0–7)
EOSINOPHIL NFR BLD: 1 % (ref 0–7)
EOSINOPHIL NFR BLD: 1 % (ref 0–7)
EOSINOPHIL NFR BLD: 3 % (ref 0–7)
ERYTHROCYTE [DISTWIDTH] IN BLOOD BY AUTOMATED COUNT: 12.1 % (ref 11.5–14.5)
ERYTHROCYTE [DISTWIDTH] IN BLOOD BY AUTOMATED COUNT: 12.2 % (ref 11.5–14.5)
ERYTHROCYTE [DISTWIDTH] IN BLOOD BY AUTOMATED COUNT: 12.2 % (ref 11.5–14.5)
ERYTHROCYTE [DISTWIDTH] IN BLOOD BY AUTOMATED COUNT: 12.3 % (ref 11.5–14.5)
ERYTHROCYTE [DISTWIDTH] IN BLOOD BY AUTOMATED COUNT: 12.4 % (ref 11.5–14.5)
ERYTHROCYTE [DISTWIDTH] IN BLOOD BY AUTOMATED COUNT: 12.4 % (ref 11.5–14.5)
ERYTHROCYTE [DISTWIDTH] IN BLOOD BY AUTOMATED COUNT: 12.5 % (ref 11.5–14.5)
ERYTHROCYTE [DISTWIDTH] IN BLOOD BY AUTOMATED COUNT: 12.6 % (ref 11.5–14.5)
ERYTHROCYTE [DISTWIDTH] IN BLOOD BY AUTOMATED COUNT: 12.8 % (ref 11.5–14.5)
ERYTHROCYTE [DISTWIDTH] IN BLOOD BY AUTOMATED COUNT: 12.9 % (ref 11.5–14.5)
ERYTHROCYTE [DISTWIDTH] IN BLOOD BY AUTOMATED COUNT: 13.1 % (ref 11.5–14.5)
ERYTHROCYTE [DISTWIDTH] IN BLOOD BY AUTOMATED COUNT: 13.3 % (ref 11.5–14.5)
EST. AVERAGE GLUCOSE BLD GHB EST-MCNC: 91 MG/DL
FIBRINOGEN PPP-MCNC: 719 MG/DL (ref 200–475)
FIBRINOGEN PPP-MCNC: <60 MG/DL (ref 200–475)
FIO2 ON VENT: 1 %
FIO2 ON VENT: 100 %
GAS FLOW.O2 O2 DELIVERY SYS: ABNORMAL
GAS FLOW.O2 SETTING OXYMISER: 14 BPM
GAS FLOW.O2 SETTING OXYMISER: 14 BPM
GAS FLOW.O2 SETTING OXYMISER: 16
GAS FLOW.O2 SETTING OXYMISER: 16 BPM
GAS FLOW.O2 SETTING OXYMISER: 18 BPM
GAS FLOW.O2 SETTING OXYMISER: 18 BPM
GAS FLOW.O2 SETTING OXYMISER: 28 BPM
GGT SERPL-CCNC: 44 U/L (ref 5–55)
GLOBULIN SER CALC-MCNC: 2.5 G/DL (ref 2–4)
GLOBULIN SER CALC-MCNC: 2.8 G/DL (ref 2–4)
GLOBULIN SER CALC-MCNC: 2.9 G/DL (ref 2–4)
GLOBULIN SER CALC-MCNC: 2.9 G/DL (ref 2–4)
GLOBULIN SER CALC-MCNC: 3 G/DL (ref 2–4)
GLOBULIN SER CALC-MCNC: 3.1 G/DL (ref 2–4)
GLOBULIN SER CALC-MCNC: 3.5 G/DL (ref 2–4)
GLUCOSE BLD STRIP.AUTO-MCNC: 102 MG/DL (ref 65–117)
GLUCOSE BLD STRIP.AUTO-MCNC: 102 MG/DL (ref 65–117)
GLUCOSE BLD STRIP.AUTO-MCNC: 103 MG/DL (ref 65–117)
GLUCOSE BLD STRIP.AUTO-MCNC: 104 MG/DL (ref 65–117)
GLUCOSE BLD STRIP.AUTO-MCNC: 107 MG/DL (ref 65–117)
GLUCOSE BLD STRIP.AUTO-MCNC: 108 MG/DL (ref 65–117)
GLUCOSE BLD STRIP.AUTO-MCNC: 108 MG/DL (ref 65–117)
GLUCOSE BLD STRIP.AUTO-MCNC: 111 MG/DL (ref 65–117)
GLUCOSE BLD STRIP.AUTO-MCNC: 111 MG/DL (ref 65–117)
GLUCOSE BLD STRIP.AUTO-MCNC: 112 MG/DL (ref 65–117)
GLUCOSE BLD STRIP.AUTO-MCNC: 113 MG/DL (ref 65–117)
GLUCOSE BLD STRIP.AUTO-MCNC: 114 MG/DL (ref 65–117)
GLUCOSE BLD STRIP.AUTO-MCNC: 114 MG/DL (ref 65–117)
GLUCOSE BLD STRIP.AUTO-MCNC: 115 MG/DL (ref 65–117)
GLUCOSE BLD STRIP.AUTO-MCNC: 117 MG/DL (ref 65–117)
GLUCOSE BLD STRIP.AUTO-MCNC: 119 MG/DL (ref 65–117)
GLUCOSE BLD STRIP.AUTO-MCNC: 121 MG/DL (ref 65–117)
GLUCOSE BLD STRIP.AUTO-MCNC: 122 MG/DL (ref 65–117)
GLUCOSE BLD STRIP.AUTO-MCNC: 123 MG/DL (ref 65–117)
GLUCOSE BLD STRIP.AUTO-MCNC: 128 MG/DL (ref 65–117)
GLUCOSE BLD STRIP.AUTO-MCNC: 130 MG/DL (ref 65–117)
GLUCOSE BLD STRIP.AUTO-MCNC: 134 MG/DL (ref 65–117)
GLUCOSE BLD STRIP.AUTO-MCNC: 137 MG/DL (ref 65–117)
GLUCOSE BLD STRIP.AUTO-MCNC: 138 MG/DL (ref 65–117)
GLUCOSE BLD STRIP.AUTO-MCNC: 140 MG/DL (ref 65–117)
GLUCOSE BLD STRIP.AUTO-MCNC: 143 MG/DL (ref 65–117)
GLUCOSE BLD STRIP.AUTO-MCNC: 143 MG/DL (ref 65–117)
GLUCOSE BLD STRIP.AUTO-MCNC: 147 MG/DL (ref 65–117)
GLUCOSE BLD STRIP.AUTO-MCNC: 152 MG/DL (ref 65–117)
GLUCOSE BLD STRIP.AUTO-MCNC: 152 MG/DL (ref 65–117)
GLUCOSE BLD STRIP.AUTO-MCNC: 163 MG/DL (ref 65–117)
GLUCOSE BLD STRIP.AUTO-MCNC: 166 MG/DL (ref 65–117)
GLUCOSE BLD STRIP.AUTO-MCNC: 169 MG/DL (ref 65–117)
GLUCOSE BLD STRIP.AUTO-MCNC: 174 MG/DL (ref 65–117)
GLUCOSE BLD STRIP.AUTO-MCNC: 175 MG/DL (ref 65–117)
GLUCOSE BLD STRIP.AUTO-MCNC: 180 MG/DL (ref 65–117)
GLUCOSE BLD STRIP.AUTO-MCNC: 182 MG/DL (ref 65–117)
GLUCOSE BLD STRIP.AUTO-MCNC: 187 MG/DL (ref 65–117)
GLUCOSE BLD STRIP.AUTO-MCNC: 194 MG/DL (ref 65–117)
GLUCOSE BLD STRIP.AUTO-MCNC: 217 MG/DL (ref 65–117)
GLUCOSE BLD STRIP.AUTO-MCNC: 219 MG/DL (ref 65–117)
GLUCOSE BLD STRIP.AUTO-MCNC: 221 MG/DL (ref 65–117)
GLUCOSE BLD STRIP.AUTO-MCNC: 230 MG/DL (ref 65–117)
GLUCOSE BLD STRIP.AUTO-MCNC: 239 MG/DL (ref 65–117)
GLUCOSE BLD STRIP.AUTO-MCNC: 274 MG/DL (ref 65–117)
GLUCOSE BLD STRIP.AUTO-MCNC: 379 MG/DL (ref 74–106)
GLUCOSE BLD STRIP.AUTO-MCNC: 54 MG/DL (ref 65–117)
GLUCOSE BLD STRIP.AUTO-MCNC: 64 MG/DL (ref 65–117)
GLUCOSE BLD STRIP.AUTO-MCNC: 66 MG/DL (ref 65–117)
GLUCOSE BLD STRIP.AUTO-MCNC: 71 MG/DL (ref 65–117)
GLUCOSE BLD STRIP.AUTO-MCNC: 78 MG/DL (ref 65–117)
GLUCOSE BLD STRIP.AUTO-MCNC: 78 MG/DL (ref 65–117)
GLUCOSE BLD STRIP.AUTO-MCNC: 83 MG/DL (ref 65–117)
GLUCOSE BLD STRIP.AUTO-MCNC: 84 MG/DL (ref 65–117)
GLUCOSE BLD STRIP.AUTO-MCNC: 88 MG/DL (ref 65–117)
GLUCOSE BLD STRIP.AUTO-MCNC: 90 MG/DL (ref 65–117)
GLUCOSE BLD STRIP.AUTO-MCNC: 93 MG/DL (ref 65–117)
GLUCOSE BLD STRIP.AUTO-MCNC: 94 MG/DL (ref 65–117)
GLUCOSE BLD STRIP.AUTO-MCNC: 95 MG/DL (ref 65–117)
GLUCOSE BLD STRIP.AUTO-MCNC: 96 MG/DL (ref 65–117)
GLUCOSE BLD STRIP.AUTO-MCNC: 97 MG/DL (ref 65–117)
GLUCOSE BLD STRIP.AUTO-MCNC: 98 MG/DL (ref 65–117)
GLUCOSE BLD STRIP.AUTO-MCNC: 99 MG/DL (ref 65–117)
GLUCOSE BLD STRIP.AUTO-MCNC: 99 MG/DL (ref 65–117)
GLUCOSE BLD STRIP.AUTO-MCNC: NORMAL MG/DL (ref 65–117)
GLUCOSE BLD-MCNC: 119 MG/DL (ref 65–100)
GLUCOSE BLD-MCNC: 195 MG/DL (ref 65–100)
GLUCOSE BLD-MCNC: 204 MG/DL (ref 65–100)
GLUCOSE BLD-MCNC: 282 MG/DL (ref 65–100)
GLUCOSE SERPL-MCNC: 104 MG/DL (ref 65–100)
GLUCOSE SERPL-MCNC: 109 MG/DL (ref 65–100)
GLUCOSE SERPL-MCNC: 110 MG/DL (ref 65–100)
GLUCOSE SERPL-MCNC: 111 MG/DL (ref 65–100)
GLUCOSE SERPL-MCNC: 115 MG/DL (ref 65–100)
GLUCOSE SERPL-MCNC: 116 MG/DL (ref 65–100)
GLUCOSE SERPL-MCNC: 123 MG/DL (ref 65–100)
GLUCOSE SERPL-MCNC: 124 MG/DL (ref 65–100)
GLUCOSE SERPL-MCNC: 125 MG/DL (ref 65–100)
GLUCOSE SERPL-MCNC: 126 MG/DL (ref 65–100)
GLUCOSE SERPL-MCNC: 131 MG/DL (ref 65–100)
GLUCOSE SERPL-MCNC: 134 MG/DL (ref 65–100)
GLUCOSE SERPL-MCNC: 136 MG/DL (ref 65–100)
GLUCOSE SERPL-MCNC: 138 MG/DL (ref 65–100)
GLUCOSE SERPL-MCNC: 145 MG/DL (ref 65–100)
GLUCOSE SERPL-MCNC: 148 MG/DL (ref 65–100)
GLUCOSE SERPL-MCNC: 151 MG/DL (ref 65–100)
GLUCOSE SERPL-MCNC: 154 MG/DL (ref 65–100)
GLUCOSE SERPL-MCNC: 157 MG/DL (ref 65–100)
GLUCOSE SERPL-MCNC: 162 MG/DL (ref 65–100)
GLUCOSE SERPL-MCNC: 163 MG/DL (ref 65–100)
GLUCOSE SERPL-MCNC: 179 MG/DL (ref 65–100)
GLUCOSE SERPL-MCNC: 185 MG/DL (ref 65–100)
GLUCOSE SERPL-MCNC: 186 MG/DL (ref 65–100)
GLUCOSE SERPL-MCNC: 237 MG/DL (ref 65–100)
GLUCOSE SERPL-MCNC: 262 MG/DL (ref 65–100)
GLUCOSE SERPL-MCNC: 278 MG/DL (ref 65–100)
GLUCOSE SERPL-MCNC: 287 MG/DL (ref 65–100)
GLUCOSE SERPL-MCNC: 94 MG/DL (ref 65–100)
GLUCOSE UR STRIP.AUTO-MCNC: 500 MG/DL
GLUCOSE UR STRIP.AUTO-MCNC: NEGATIVE MG/DL
GLUCOSE UR STRIP.AUTO-MCNC: NEGATIVE MG/DL
GLUCOSE, GLC: 102 MG/DL
GLUCOSE, GLC: 108 MG/DL
GLUCOSE, GLC: 111 MG/DL
GLUCOSE, GLC: 112 MG/DL
GLUCOSE, GLC: 112 MG/DL
GLUCOSE, GLC: 113 MG/DL
GLUCOSE, GLC: 115 MG/DL
GLUCOSE, GLC: 117 MG/DL
GLUCOSE, GLC: 119 MG/DL
GLUCOSE, GLC: 122 MG/DL
GLUCOSE, GLC: 123 MG/DL
GLUCOSE, GLC: 140 MG/DL
GLUCOSE, GLC: 143 MG/DL
GLUCOSE, GLC: 152 MG/DL
GLUCOSE, GLC: 163 MG/DL
GLUCOSE, GLC: 166 MG/DL
GLUCOSE, GLC: 169 MG/DL
GLUCOSE, GLC: 174 MG/DL
GLUCOSE, GLC: 180 MG/DL
GLUCOSE, GLC: 182 MG/DL
GLUCOSE, GLC: 195 MG/DL
GLUCOSE, GLC: 217 MG/DL
GLUCOSE, GLC: 219 MG/DL
GLUCOSE, GLC: 221 MG/DL
GLUCOSE, GLC: 230 MG/DL
GLUCOSE, GLC: 239 MG/DL
GLUCOSE, GLC: 274 MG/DL
GLUCOSE, GLC: 78 MG/DL
GLUCOSE, GLC: 78 MG/DL
GLUCOSE, GLC: 83 MG/DL
GLUCOSE, GLC: 84 MG/DL
GLUCOSE, GLC: 93 MG/DL
GLUCOSE, GLC: 96 MG/DL
GLUCOSE, GLC: 97 MG/DL
GLUCOSE, GLC: 99 MG/DL
GRAM STN SPEC: NORMAL
HBA1C MFR BLD: 4.8 % (ref 4–5.6)
HCG UR QL: NEGATIVE
HCO3 BLD-SCNC: 15.8 MMOL/L (ref 22–26)
HCO3 BLD-SCNC: 18.7 MMOL/L (ref 22–26)
HCO3 BLD-SCNC: 19.1 MMOL/L (ref 22–26)
HCO3 BLD-SCNC: 20.9 MMOL/L (ref 22–26)
HCO3 BLD-SCNC: 21.1 MMOL/L (ref 22–26)
HCO3 BLD-SCNC: 21.8 MMOL/L (ref 22–26)
HCO3 BLD-SCNC: 22.5 MMOL/L (ref 22–26)
HCO3 BLD-SCNC: 23.1 MMOL/L (ref 22–26)
HCO3 BLD-SCNC: 23.5 MMOL/L (ref 22–26)
HCO3 BLDA-SCNC: 15 MMOL/L (ref 22–26)
HCO3 BLDA-SCNC: 17 MMOL/L (ref 22–26)
HCO3 BLDA-SCNC: 18 MMOL/L (ref 22–26)
HCO3 BLDA-SCNC: 19 MMOL/L (ref 22–26)
HCO3 BLDA-SCNC: 20 MMOL/L (ref 22–26)
HCO3 BLDA-SCNC: 22 MMOL/L (ref 22–26)
HCO3 BLDV-SCNC: 16 MMOL/L (ref 23–28)
HCO3 BLDV-SCNC: 22 MMOL/L (ref 23–28)
HCT VFR BLD AUTO: 20 % (ref 35–47)
HCT VFR BLD AUTO: 21.8 % (ref 35–47)
HCT VFR BLD AUTO: 22.1 % (ref 35–47)
HCT VFR BLD AUTO: 22.7 % (ref 35–47)
HCT VFR BLD AUTO: 23.6 % (ref 35–47)
HCT VFR BLD AUTO: 23.9 % (ref 35–47)
HCT VFR BLD AUTO: 25.2 % (ref 35–47)
HCT VFR BLD AUTO: 26 % (ref 35–47)
HCT VFR BLD AUTO: 26.4 % (ref 35–47)
HCT VFR BLD AUTO: 27.1 % (ref 35–47)
HCT VFR BLD AUTO: 32.2 % (ref 35–47)
HCT VFR BLD AUTO: 37.4 % (ref 35–47)
HCT VFR BLD AUTO: 38.7 % (ref 35–47)
HCT VFR BLD AUTO: 40.6 % (ref 35–47)
HDLC SERPL-MCNC: 31 MG/DL
HDLC SERPL: 5.4 (ref 0–5)
HGB BLD-MCNC: 11.1 G/DL (ref 11.5–16)
HGB BLD-MCNC: 12.7 G/DL (ref 11.5–16)
HGB BLD-MCNC: 13.1 G/DL (ref 11.5–16)
HGB BLD-MCNC: 13.3 G/DL (ref 11.5–16)
HGB BLD-MCNC: 6.8 G/DL (ref 11.5–16)
HGB BLD-MCNC: 7.6 G/DL (ref 11.5–16)
HGB BLD-MCNC: 7.6 G/DL (ref 11.5–16)
HGB BLD-MCNC: 7.7 G/DL (ref 11.5–16)
HGB BLD-MCNC: 7.7 G/DL (ref 11.5–16)
HGB BLD-MCNC: 8 G/DL (ref 11.5–16)
HGB BLD-MCNC: 8.6 G/DL (ref 11.5–16)
HGB BLD-MCNC: 8.8 G/DL (ref 11.5–16)
HGB BLD-MCNC: 8.9 G/DL (ref 11.5–16)
HGB BLD-MCNC: 9.1 G/DL (ref 11.5–16)
HGB UR QL STRIP: ABNORMAL
HGB UR QL STRIP: NEGATIVE
HGB UR QL STRIP: NEGATIVE
HIGH TARGET, HITG: 130 MG/DL
HIGH TARGET, HITG: 140 MG/DL
HISTORY CHECKED?,CKHIST: NORMAL
HSV1 DNA # BAL NAA+PROBE: NOT DETECTED COPIES/ML
HSV2 DNA # BAL NAA+PROBE: NOT DETECTED COPIES/ML
IMM GRANULOCYTES # BLD AUTO: 0 K/UL (ref 0–0.04)
IMM GRANULOCYTES # BLD AUTO: 0 K/UL (ref 0–0.04)
IMM GRANULOCYTES # BLD AUTO: 0.1 K/UL (ref 0–0.04)
IMM GRANULOCYTES # BLD AUTO: 0.1 K/UL (ref 0–0.04)
IMM GRANULOCYTES # BLD AUTO: 0.2 K/UL (ref 0–0.04)
IMM GRANULOCYTES # BLD AUTO: 0.3 K/UL (ref 0–0.04)
IMM GRANULOCYTES NFR BLD AUTO: 0 % (ref 0–0.5)
IMM GRANULOCYTES NFR BLD AUTO: 0 % (ref 0–0.5)
IMM GRANULOCYTES NFR BLD AUTO: 1 % (ref 0–0.5)
IMM GRANULOCYTES NFR BLD AUTO: 2 % (ref 0–0.5)
IMM GRANULOCYTES NFR BLD AUTO: 2 % (ref 0–0.5)
INR PPP: 1.1 (ref 0.9–1.1)
INSULIN ADMINSTERED, INSADM: 0 UNITS/HOUR
INSULIN ADMINSTERED, INSADM: 0.2 UNITS/HOUR
INSULIN ADMINSTERED, INSADM: 0.4 UNITS/HOUR
INSULIN ADMINSTERED, INSADM: 1.9 UNITS/HOUR
INSULIN ADMINSTERED, INSADM: 10 UNITS/HOUR
INSULIN ADMINSTERED, INSADM: 10.8 UNITS/HOUR
INSULIN ADMINSTERED, INSADM: 11.2 UNITS/HOUR
INSULIN ADMINSTERED, INSADM: 11.3 UNITS/HOUR
INSULIN ADMINSTERED, INSADM: 11.3 UNITS/HOUR
INSULIN ADMINSTERED, INSADM: 11.6 UNITS/HOUR
INSULIN ADMINSTERED, INSADM: 12.2 UNITS/HOUR
INSULIN ADMINSTERED, INSADM: 12.6 UNITS/HOUR
INSULIN ADMINSTERED, INSADM: 3.2 UNITS/HOUR
INSULIN ADMINSTERED, INSADM: 3.7 UNITS/HOUR
INSULIN ADMINSTERED, INSADM: 4.1 UNITS/HOUR
INSULIN ADMINSTERED, INSADM: 4.3 UNITS/HOUR
INSULIN ADMINSTERED, INSADM: 5 UNITS/HOUR
INSULIN ADMINSTERED, INSADM: 5.1 UNITS/HOUR
INSULIN ADMINSTERED, INSADM: 5.6 UNITS/HOUR
INSULIN ADMINSTERED, INSADM: 5.8 UNITS/HOUR
INSULIN ADMINSTERED, INSADM: 6 UNITS/HOUR
INSULIN ADMINSTERED, INSADM: 6.4 UNITS/HOUR
INSULIN ADMINSTERED, INSADM: 6.6 UNITS/HOUR
INSULIN ADMINSTERED, INSADM: 6.6 UNITS/HOUR
INSULIN ADMINSTERED, INSADM: 6.7 UNITS/HOUR
INSULIN ADMINSTERED, INSADM: 7.1 UNITS/HOUR
INSULIN ADMINSTERED, INSADM: 7.2 UNITS/HOUR
INSULIN ADMINSTERED, INSADM: 7.4 UNITS/HOUR
INSULIN ADMINSTERED, INSADM: 7.4 UNITS/HOUR
INSULIN ADMINSTERED, INSADM: 7.6 UNITS/HOUR
INSULIN ADMINSTERED, INSADM: 8 UNITS/HOUR
INSULIN ADMINSTERED, INSADM: 8.5 UNITS/HOUR
INSULIN ADMINSTERED, INSADM: 9.1 UNITS/HOUR
INSULIN ADMINSTERED, INSADM: 9.4 UNITS/HOUR
INSULIN ORDER, INSORD: 0 UNITS/HOUR
INSULIN ORDER, INSORD: 0.2 UNITS/HOUR
INSULIN ORDER, INSORD: 0.4 UNITS/HOUR
INSULIN ORDER, INSORD: 1.9 UNITS/HOUR
INSULIN ORDER, INSORD: 10 UNITS/HOUR
INSULIN ORDER, INSORD: 10.8 UNITS/HOUR
INSULIN ORDER, INSORD: 11.2 UNITS/HOUR
INSULIN ORDER, INSORD: 11.3 UNITS/HOUR
INSULIN ORDER, INSORD: 11.3 UNITS/HOUR
INSULIN ORDER, INSORD: 11.6 UNITS/HOUR
INSULIN ORDER, INSORD: 12.2 UNITS/HOUR
INSULIN ORDER, INSORD: 12.6 UNITS/HOUR
INSULIN ORDER, INSORD: 3.2 UNITS/HOUR
INSULIN ORDER, INSORD: 3.7 UNITS/HOUR
INSULIN ORDER, INSORD: 4.1 UNITS/HOUR
INSULIN ORDER, INSORD: 4.3 UNITS/HOUR
INSULIN ORDER, INSORD: 5 UNITS/HOUR
INSULIN ORDER, INSORD: 5.1 UNITS/HOUR
INSULIN ORDER, INSORD: 5.6 UNITS/HOUR
INSULIN ORDER, INSORD: 5.8 UNITS/HOUR
INSULIN ORDER, INSORD: 6 UNITS/HOUR
INSULIN ORDER, INSORD: 6.4 UNITS/HOUR
INSULIN ORDER, INSORD: 6.6 UNITS/HOUR
INSULIN ORDER, INSORD: 6.6 UNITS/HOUR
INSULIN ORDER, INSORD: 6.7 UNITS/HOUR
INSULIN ORDER, INSORD: 7.1 UNITS/HOUR
INSULIN ORDER, INSORD: 7.2 UNITS/HOUR
INSULIN ORDER, INSORD: 7.4 UNITS/HOUR
INSULIN ORDER, INSORD: 7.4 UNITS/HOUR
INSULIN ORDER, INSORD: 7.6 UNITS/HOUR
INSULIN ORDER, INSORD: 8 UNITS/HOUR
INSULIN ORDER, INSORD: 8.5 UNITS/HOUR
INSULIN ORDER, INSORD: 9.1 UNITS/HOUR
INSULIN ORDER, INSORD: 9.4 UNITS/HOUR
KETONES UR QL STRIP.AUTO: ABNORMAL MG/DL
KETONES UR QL STRIP.AUTO: NEGATIVE MG/DL
KETONES UR QL STRIP.AUTO: NEGATIVE MG/DL
LACTATE BLD-SCNC: >18 MMOL/L (ref 0.4–2)
LACTATE SERPL-SCNC: 1 MMOL/L (ref 0.4–2)
LACTATE SERPL-SCNC: 2.3 MMOL/L (ref 0.4–2)
LACTATE SERPL-SCNC: 3.6 MMOL/L (ref 0.4–2)
LACTATE SERPL-SCNC: 7.3 MMOL/L (ref 0.4–2)
LACTATE SERPL-SCNC: 9.3 MMOL/L (ref 0.4–2)
LDLC SERPL CALC-MCNC: 105 MG/DL (ref 0–100)
LEUKOCYTE ESTERASE UR QL STRIP.AUTO: ABNORMAL
LEUKOCYTE ESTERASE UR QL STRIP.AUTO: NEGATIVE
LEUKOCYTE ESTERASE UR QL STRIP.AUTO: NEGATIVE
LIPASE SERPL-CCNC: 100 U/L (ref 73–393)
LIPASE SERPL-CCNC: 133 U/L (ref 73–393)
LOW TARGET, LOT: 100 MG/DL
LOW TARGET, LOT: 95 MG/DL
LYMPHOCYTES # BLD: 0.5 K/UL (ref 0.8–3.5)
LYMPHOCYTES # BLD: 0.6 K/UL (ref 0.8–3.5)
LYMPHOCYTES # BLD: 0.7 K/UL (ref 0.8–3.5)
LYMPHOCYTES # BLD: 2 K/UL (ref 0.8–3.5)
LYMPHOCYTES # BLD: 7.2 K/UL (ref 0.8–3.5)
LYMPHOCYTES NFR BLD: 15 % (ref 12–49)
LYMPHOCYTES NFR BLD: 4 % (ref 12–49)
LYMPHOCYTES NFR BLD: 53 % (ref 12–49)
MAGNESIUM SERPL-MCNC: 1.1 MG/DL (ref 1.6–2.4)
MAGNESIUM SERPL-MCNC: 2 MG/DL (ref 1.6–2.4)
MAGNESIUM SERPL-MCNC: 2.1 MG/DL (ref 1.6–2.4)
MAGNESIUM SERPL-MCNC: 2.2 MG/DL (ref 1.6–2.4)
MAGNESIUM SERPL-MCNC: 2.3 MG/DL (ref 1.6–2.4)
MAGNESIUM SERPL-MCNC: 2.5 MG/DL (ref 1.6–2.4)
MAGNESIUM SERPL-MCNC: 2.5 MG/DL (ref 1.6–2.4)
MCH RBC QN AUTO: 30.2 PG (ref 26–34)
MCH RBC QN AUTO: 30.3 PG (ref 26–34)
MCH RBC QN AUTO: 30.7 PG (ref 26–34)
MCH RBC QN AUTO: 30.9 PG (ref 26–34)
MCH RBC QN AUTO: 31 PG (ref 26–34)
MCH RBC QN AUTO: 31.1 PG (ref 26–34)
MCH RBC QN AUTO: 31.2 PG (ref 26–34)
MCH RBC QN AUTO: 31.3 PG (ref 26–34)
MCH RBC QN AUTO: 31.4 PG (ref 26–34)
MCH RBC QN AUTO: 31.6 PG (ref 26–34)
MCHC RBC AUTO-ENTMCNC: 31.3 G/DL (ref 30–36.5)
MCHC RBC AUTO-ENTMCNC: 32.6 G/DL (ref 30–36.5)
MCHC RBC AUTO-ENTMCNC: 33.3 G/DL (ref 30–36.5)
MCHC RBC AUTO-ENTMCNC: 33.5 G/DL (ref 30–36.5)
MCHC RBC AUTO-ENTMCNC: 33.6 G/DL (ref 30–36.5)
MCHC RBC AUTO-ENTMCNC: 33.9 G/DL (ref 30–36.5)
MCHC RBC AUTO-ENTMCNC: 34 G/DL (ref 30–36.5)
MCHC RBC AUTO-ENTMCNC: 34.1 G/DL (ref 30–36.5)
MCHC RBC AUTO-ENTMCNC: 34.2 G/DL (ref 30–36.5)
MCHC RBC AUTO-ENTMCNC: 34.4 G/DL (ref 30–36.5)
MCHC RBC AUTO-ENTMCNC: 34.4 G/DL (ref 30–36.5)
MCHC RBC AUTO-ENTMCNC: 34.5 G/DL (ref 30–36.5)
MCHC RBC AUTO-ENTMCNC: 34.9 G/DL (ref 30–36.5)
MCHC RBC AUTO-ENTMCNC: 35 G/DL (ref 30–36.5)
MCV RBC AUTO: 100.2 FL (ref 80–99)
MCV RBC AUTO: 88.6 FL (ref 80–99)
MCV RBC AUTO: 88.9 FL (ref 80–99)
MCV RBC AUTO: 89.4 FL (ref 80–99)
MCV RBC AUTO: 90.2 FL (ref 80–99)
MCV RBC AUTO: 90.2 FL (ref 80–99)
MCV RBC AUTO: 90.3 FL (ref 80–99)
MCV RBC AUTO: 90.6 FL (ref 80–99)
MCV RBC AUTO: 90.8 FL (ref 80–99)
MCV RBC AUTO: 90.9 FL (ref 80–99)
MCV RBC AUTO: 91.6 FL (ref 80–99)
MCV RBC AUTO: 92.5 FL (ref 80–99)
MCV RBC AUTO: 94 FL (ref 80–99)
MCV RBC AUTO: 94.8 FL (ref 80–99)
METAMYELOCYTES NFR BLD MANUAL: 1 %
METAMYELOCYTES NFR BLD MANUAL: 1 %
METHADONE UR QL: NEGATIVE
METHGB MFR BLD: 0.3 % (ref 0–1.4)
MINUTES UNTIL NEXT BG, NBG: 120 MIN
MINUTES UNTIL NEXT BG, NBG: 120 MIN
MINUTES UNTIL NEXT BG, NBG: 60 MIN
MONOCYTES # BLD: 0.2 K/UL (ref 0–1)
MONOCYTES # BLD: 0.3 K/UL (ref 0–1)
MONOCYTES # BLD: 0.3 K/UL (ref 0–1)
MONOCYTES # BLD: 0.5 K/UL (ref 0–1)
MONOCYTES # BLD: 0.7 K/UL (ref 0–1)
MONOCYTES # BLD: 0.7 K/UL (ref 0–1)
MONOCYTES # BLD: 1.1 K/UL (ref 0–1)
MONOCYTES # BLD: 1.1 K/UL (ref 0–1)
MONOCYTES NFR BLD: 1 % (ref 5–13)
MONOCYTES NFR BLD: 2 % (ref 5–13)
MONOCYTES NFR BLD: 2 % (ref 5–13)
MONOCYTES NFR BLD: 3 % (ref 5–13)
MONOCYTES NFR BLD: 4 % (ref 5–13)
MONOCYTES NFR BLD: 5 % (ref 5–13)
MONOCYTES NFR BLD: 8 % (ref 5–13)
MONOCYTES NFR BLD: 8 % (ref 5–13)
MULTIPLIER, MUL: 0
MULTIPLIER, MUL: 0.01
MULTIPLIER, MUL: 0.02
MULTIPLIER, MUL: 0.02
MULTIPLIER, MUL: 0.03
MULTIPLIER, MUL: 0.04
MULTIPLIER, MUL: 0.05
MULTIPLIER, MUL: 0.06
MULTIPLIER, MUL: 0.07
MULTIPLIER, MUL: 0.08
MULTIPLIER, MUL: 0.09
MULTIPLIER, MUL: 0.1
MULTIPLIER, MUL: 0.11
MULTIPLIER, MUL: 0.12
MULTIPLIER, MUL: 0.13
MYELOCYTES NFR BLD MANUAL: 2 %
NEUTS BAND NFR BLD MANUAL: 2 %
NEUTS SEG # BLD: 13 K/UL (ref 1.8–8)
NEUTS SEG # BLD: 13.6 K/UL (ref 1.8–8)
NEUTS SEG # BLD: 13.7 K/UL (ref 1.8–8)
NEUTS SEG # BLD: 14.2 K/UL (ref 1.8–8)
NEUTS SEG # BLD: 14.8 K/UL (ref 1.8–8)
NEUTS SEG # BLD: 14.8 K/UL (ref 1.8–8)
NEUTS SEG # BLD: 4.8 K/UL (ref 1.8–8)
NEUTS SEG # BLD: 9.7 K/UL (ref 1.8–8)
NEUTS SEG NFR BLD: 35 % (ref 32–75)
NEUTS SEG NFR BLD: 73 % (ref 32–75)
NEUTS SEG NFR BLD: 90 % (ref 32–75)
NEUTS SEG NFR BLD: 90 % (ref 32–75)
NEUTS SEG NFR BLD: 91 % (ref 32–75)
NEUTS SEG NFR BLD: 92 % (ref 32–75)
NEUTS SEG NFR BLD: 92 % (ref 32–75)
NEUTS SEG NFR BLD: 94 % (ref 32–75)
NITRITE UR QL STRIP.AUTO: NEGATIVE
NRBC # BLD: 0 K/UL (ref 0–0.01)
NRBC # BLD: 0.02 K/UL (ref 0–0.01)
NRBC # BLD: 0.03 K/UL (ref 0–0.01)
NRBC BLD-RTO: 0 PER 100 WBC
NRBC BLD-RTO: 0.1 PER 100 WBC
NRBC BLD-RTO: 0.3 PER 100 WBC
NUC CELL # FLD: 416 /CU MM
O2/TOTAL GAS SETTING VFR VENT: 100 %
OPIATES UR QL: NEGATIVE
ORDER INITIALS, ORDINIT: NORMAL
OXYHGB MFR BLD: 93.7 % (ref 94–97)
P-R INTERVAL, ECG05: 150 MS
P-R INTERVAL, ECG05: 152 MS
PCO2 BLD: 26 MMHG (ref 35–45)
PCO2 BLD: 29.4 MMHG (ref 35–45)
PCO2 BLD: 36.3 MMHG (ref 35–45)
PCO2 BLD: 36.9 MMHG (ref 35–45)
PCO2 BLD: 39.3 MMHG (ref 35–45)
PCO2 BLD: 39.7 MMHG (ref 35–45)
PCO2 BLD: 40.2 MMHG (ref 35–45)
PCO2 BLD: 43.7 MMHG (ref 35–45)
PCO2 BLD: 77.4 MMHG (ref 35–45)
PCO2 BLDA: 25 MMHG (ref 35–45)
PCO2 BLDA: 32 MMHG (ref 35–45)
PCO2 BLDA: 33 MMHG (ref 35–45)
PCO2 BLDA: 33 MMHG (ref 35–45)
PCO2 BLDA: 34 MMHG (ref 35–45)
PCO2 BLDA: 35 MMHG (ref 35–45)
PCO2 BLDA: 35 MMHG (ref 35–45)
PCO2 BLDA: 39 MMHG (ref 35–45)
PCO2 BLDV: 34.1 MMHG (ref 41–51)
PCO2 BLDV: 35.1 MMHG (ref 41–51)
PCO2 BLDV: >110 MMHG (ref 41–51)
PCP UR QL: NEGATIVE
PEEP RESPIRATORY: 14 CMH2O
PEEP RESPIRATORY: 5
PEEP RESPIRATORY: 5
PEEP RESPIRATORY: 5 CMH2O
PH BLD: 6.92 (ref 7.35–7.45)
PH BLD: 7.29 (ref 7.35–7.45)
PH BLD: 7.33 (ref 7.35–7.45)
PH BLD: 7.37 (ref 7.35–7.45)
PH BLD: 7.37 (ref 7.35–7.45)
PH BLD: 7.39 (ref 7.35–7.45)
PH BLD: 7.41 (ref 7.35–7.45)
PH BLD: 7.42 (ref 7.35–7.45)
PH BLD: 7.46 (ref 7.35–7.45)
PH BLDA: 7.28 (ref 7.35–7.45)
PH BLDA: 7.34 (ref 7.35–7.45)
PH BLDA: 7.35 (ref 7.35–7.45)
PH BLDA: 7.36 (ref 7.35–7.45)
PH BLDA: 7.38 (ref 7.35–7.45)
PH BLDA: 7.39 (ref 7.35–7.45)
PH BLDA: 7.41 (ref 7.35–7.45)
PH BLDA: 7.44 (ref 7.35–7.45)
PH BLDV: 6.7 (ref 7.32–7.42)
PH BLDV: 7.28 (ref 7.32–7.42)
PH BLDV: 7.4 (ref 7.32–7.42)
PH UR STRIP: 6 (ref 5–8)
PH UR STRIP: 6 (ref 5–8)
PH UR STRIP: 6.5 (ref 5–8)
PHOSPHATE SERPL-MCNC: 0.9 MG/DL (ref 2.6–4.7)
PHOSPHATE SERPL-MCNC: 1 MG/DL (ref 2.6–4.7)
PHOSPHATE SERPL-MCNC: 2.4 MG/DL (ref 2.6–4.7)
PHOSPHATE SERPL-MCNC: 2.5 MG/DL (ref 2.6–4.7)
PHOSPHATE SERPL-MCNC: 3 MG/DL (ref 2.6–4.7)
PHOSPHATE SERPL-MCNC: 3 MG/DL (ref 2.6–4.7)
PHOSPHATE SERPL-MCNC: 3.1 MG/DL (ref 2.6–4.7)
PHOSPHATE SERPL-MCNC: 3.2 MG/DL (ref 2.6–4.7)
PHOSPHATE SERPL-MCNC: 3.3 MG/DL (ref 2.6–4.7)
PHOSPHATE SERPL-MCNC: 3.6 MG/DL (ref 2.6–4.7)
PHOSPHATE SERPL-MCNC: 4.1 MG/DL (ref 2.6–4.7)
PHOSPHATE SERPL-MCNC: 4.1 MG/DL (ref 2.6–4.7)
PHOSPHATE SERPL-MCNC: 4.5 MG/DL (ref 2.6–4.7)
PHOSPHATE SERPL-MCNC: 4.7 MG/DL (ref 2.6–4.7)
PHOSPHATE SERPL-MCNC: 8.4 MG/DL (ref 2.6–4.7)
PIP ISTAT,IPIP: 12
PLATELET # BLD AUTO: 101 K/UL (ref 150–400)
PLATELET # BLD AUTO: 109 K/UL (ref 150–400)
PLATELET # BLD AUTO: 114 K/UL (ref 150–400)
PLATELET # BLD AUTO: 128 K/UL (ref 150–400)
PLATELET # BLD AUTO: 131 K/UL (ref 150–400)
PLATELET # BLD AUTO: 138 K/UL (ref 150–400)
PLATELET # BLD AUTO: 143 K/UL (ref 150–400)
PLATELET # BLD AUTO: 144 K/UL (ref 150–400)
PLATELET # BLD AUTO: 147 K/UL (ref 150–400)
PLATELET # BLD AUTO: 150 K/UL (ref 150–400)
PLATELET # BLD AUTO: 160 K/UL (ref 150–400)
PLATELET # BLD AUTO: 174 K/UL (ref 150–400)
PLATELET # BLD AUTO: 202 K/UL (ref 150–400)
PLATELET # BLD AUTO: 95 K/UL (ref 150–400)
PMV BLD AUTO: 10.4 FL (ref 8.9–12.9)
PMV BLD AUTO: 10.8 FL (ref 8.9–12.9)
PMV BLD AUTO: 11.1 FL (ref 8.9–12.9)
PMV BLD AUTO: 11.1 FL (ref 8.9–12.9)
PMV BLD AUTO: 11.2 FL (ref 8.9–12.9)
PMV BLD AUTO: 11.2 FL (ref 8.9–12.9)
PMV BLD AUTO: 11.3 FL (ref 8.9–12.9)
PMV BLD AUTO: 11.4 FL (ref 8.9–12.9)
PMV BLD AUTO: 11.5 FL (ref 8.9–12.9)
PMV BLD AUTO: 11.6 FL (ref 8.9–12.9)
PMV BLD AUTO: 11.7 FL (ref 8.9–12.9)
PMV BLD AUTO: 9.2 FL (ref 8.9–12.9)
PMV BLD AUTO: 9.4 FL (ref 8.9–12.9)
PMV BLD AUTO: 9.8 FL (ref 8.9–12.9)
PO2 BLD: 118 MMHG (ref 80–100)
PO2 BLD: 148 MMHG (ref 80–100)
PO2 BLD: 405 MMHG (ref 80–100)
PO2 BLD: 421 MMHG (ref 80–100)
PO2 BLD: 422 MMHG (ref 80–100)
PO2 BLD: 430 MMHG (ref 80–100)
PO2 BLD: 432 MMHG (ref 80–100)
PO2 BLD: 445 MMHG (ref 80–100)
PO2 BLD: 96 MMHG (ref 80–100)
PO2 BLDA: 108 MMHG (ref 80–100)
PO2 BLDA: 287 MMHG (ref 80–100)
PO2 BLDA: 322 MMHG (ref 80–100)
PO2 BLDA: 336 MMHG (ref 80–100)
PO2 BLDA: 460 MMHG (ref 80–100)
PO2 BLDA: 499 MMHG (ref 80–100)
PO2 BLDA: 507 MMHG (ref 80–100)
PO2 BLDA: 510 MMHG (ref 80–100)
PO2 BLDV: 18 MMHG (ref 25–40)
PO2 BLDV: 32 MMHG (ref 25–40)
PO2 BLDV: 47 MMHG (ref 25–40)
POTASSIUM BLD-SCNC: 3.8 MMOL/L (ref 3.5–5.5)
POTASSIUM SERPL-SCNC: 2.8 MMOL/L (ref 3.5–5.1)
POTASSIUM SERPL-SCNC: 2.9 MMOL/L (ref 3.5–5.1)
POTASSIUM SERPL-SCNC: 3 MMOL/L (ref 3.5–5.1)
POTASSIUM SERPL-SCNC: 3 MMOL/L (ref 3.5–5.1)
POTASSIUM SERPL-SCNC: 3.1 MMOL/L (ref 3.5–5.1)
POTASSIUM SERPL-SCNC: 3.1 MMOL/L (ref 3.5–5.1)
POTASSIUM SERPL-SCNC: 3.2 MMOL/L (ref 3.5–5.1)
POTASSIUM SERPL-SCNC: 3.3 MMOL/L (ref 3.5–5.1)
POTASSIUM SERPL-SCNC: 3.3 MMOL/L (ref 3.5–5.1)
POTASSIUM SERPL-SCNC: 3.4 MMOL/L (ref 3.5–5.1)
POTASSIUM SERPL-SCNC: 3.4 MMOL/L (ref 3.5–5.1)
POTASSIUM SERPL-SCNC: 3.5 MMOL/L (ref 3.5–5.1)
POTASSIUM SERPL-SCNC: 3.6 MMOL/L (ref 3.5–5.1)
POTASSIUM SERPL-SCNC: 3.7 MMOL/L (ref 3.5–5.1)
POTASSIUM SERPL-SCNC: 3.7 MMOL/L (ref 3.5–5.1)
POTASSIUM SERPL-SCNC: 3.8 MMOL/L (ref 3.5–5.1)
POTASSIUM SERPL-SCNC: 3.9 MMOL/L (ref 3.5–5.1)
POTASSIUM SERPL-SCNC: 4.1 MMOL/L (ref 3.5–5.1)
POTASSIUM SERPL-SCNC: 4.1 MMOL/L (ref 3.5–5.1)
POTASSIUM SERPL-SCNC: 4.2 MMOL/L (ref 3.5–5.1)
POTASSIUM SERPL-SCNC: 4.8 MMOL/L (ref 3.5–5.1)
PROCALCITONIN SERPL-MCNC: 36.81 NG/ML
PROCALCITONIN SERPL-MCNC: 7.28 NG/ML
PROT SERPL-MCNC: 4.5 G/DL (ref 6.4–8.2)
PROT SERPL-MCNC: 4.8 G/DL (ref 6.4–8.2)
PROT SERPL-MCNC: 4.9 G/DL (ref 6.4–8.2)
PROT SERPL-MCNC: 5 G/DL (ref 6.4–8.2)
PROT SERPL-MCNC: 5 G/DL (ref 6.4–8.2)
PROT SERPL-MCNC: 5.1 G/DL (ref 6.4–8.2)
PROT SERPL-MCNC: 5.2 G/DL (ref 6.4–8.2)
PROT SERPL-MCNC: 5.2 G/DL (ref 6.4–8.2)
PROT SERPL-MCNC: 5.3 G/DL (ref 6.4–8.2)
PROT SERPL-MCNC: 5.3 G/DL (ref 6.4–8.2)
PROT SERPL-MCNC: 5.4 G/DL (ref 6.4–8.2)
PROT SERPL-MCNC: 5.8 G/DL (ref 6.4–8.2)
PROT SERPL-MCNC: 5.9 G/DL (ref 6.4–8.2)
PROT SERPL-MCNC: 5.9 G/DL (ref 6.4–8.2)
PROT SERPL-MCNC: 6.7 G/DL (ref 6.4–8.2)
PROT UR STRIP-MCNC: 300 MG/DL
PROT UR STRIP-MCNC: NEGATIVE MG/DL
PROT UR STRIP-MCNC: NEGATIVE MG/DL
PROTHROMBIN TIME: 11.1 SEC (ref 9–11.1)
PROTHROMBIN TIME: 11.1 SEC (ref 9–11.1)
PROTHROMBIN TIME: 11.2 SEC (ref 9–11.1)
PROTHROMBIN TIME: 11.3 SEC (ref 9–11.1)
PROTHROMBIN TIME: 11.5 SEC (ref 9–11.1)
Q-T INTERVAL, ECG07: 286 MS
Q-T INTERVAL, ECG07: 290 MS
Q-T INTERVAL, ECG07: 326 MS
Q-T INTERVAL, ECG07: 332 MS
QRS DURATION, ECG06: 78 MS
QRS DURATION, ECG06: 82 MS
QRS DURATION, ECG06: 90 MS
QRS DURATION, ECG06: 92 MS
QTC CALCULATION (BEZET), ECG08: 431 MS
QTC CALCULATION (BEZET), ECG08: 433 MS
QTC CALCULATION (BEZET), ECG08: 460 MS
QTC CALCULATION (BEZET), ECG08: 467 MS
RBC # BLD AUTO: 2.25 M/UL (ref 3.8–5.2)
RBC # BLD AUTO: 2.45 M/UL (ref 3.8–5.2)
RBC # BLD AUTO: 2.46 M/UL (ref 3.8–5.2)
RBC # BLD AUTO: 2.49 M/UL (ref 3.8–5.2)
RBC # BLD AUTO: 2.54 M/UL (ref 3.8–5.2)
RBC # BLD AUTO: 2.61 M/UL (ref 3.8–5.2)
RBC # BLD AUTO: 2.78 M/UL (ref 3.8–5.2)
RBC # BLD AUTO: 2.81 M/UL (ref 3.8–5.2)
RBC # BLD AUTO: 2.86 M/UL (ref 3.8–5.2)
RBC # BLD AUTO: 2.93 M/UL (ref 3.8–5.2)
RBC # BLD AUTO: 3.57 M/UL (ref 3.8–5.2)
RBC # BLD AUTO: 4.05 M/UL (ref 3.8–5.2)
RBC # BLD AUTO: 4.14 M/UL (ref 3.8–5.2)
RBC # BLD AUTO: 4.26 M/UL (ref 3.8–5.2)
RBC # FLD: >100 /CU MM
RBC MORPH BLD: ABNORMAL
REPORTED DOSE,DOSE: ABNORMAL UNITS
REPORTED DOSE,DOSE: ABNORMAL UNITS
REPORTED DOSE,DOSE: NORMAL UNITS
REPORTED DOSE/TIME,TMG: 0
REPORTED DOSE/TIME,TMG: ABNORMAL
REPORTED DOSE/TIME,TMG: NORMAL
SALICYLATES SERPL-MCNC: <1.7 MG/DL (ref 2.8–20)
SAMPLES BEING HELD,HOLD: NORMAL
SAMPLES BEING HELD,HOLD: NORMAL
SAO2 % BLD: 100 % (ref 92–97)
SAO2 % BLD: 89.7 % (ref 92–97)
SAO2 % BLD: 94 % (ref 95–99)
SAO2 % BLD: 98 % (ref 92–97)
SAO2 % BLD: 98.9 % (ref 92–97)
SAO2 % BLD: 99.4 % (ref 92–97)
SAO2 % BLDV: 54 % (ref 65–88)
SAO2% DEVICE SAO2% SENSOR NAME: ABNORMAL
SERVICE CMNT-IMP: ABNORMAL
SERVICE CMNT-IMP: NORMAL
SODIUM BLD-SCNC: 150 MMOL/L (ref 136–145)
SODIUM SERPL-SCNC: 139 MMOL/L (ref 136–145)
SODIUM SERPL-SCNC: 140 MMOL/L (ref 136–145)
SODIUM SERPL-SCNC: 140 MMOL/L (ref 136–145)
SODIUM SERPL-SCNC: 141 MMOL/L (ref 136–145)
SODIUM SERPL-SCNC: 144 MMOL/L (ref 136–145)
SODIUM SERPL-SCNC: 146 MMOL/L (ref 136–145)
SODIUM SERPL-SCNC: 148 MMOL/L (ref 136–145)
SODIUM SERPL-SCNC: 149 MMOL/L (ref 136–145)
SODIUM SERPL-SCNC: 150 MMOL/L (ref 136–145)
SODIUM SERPL-SCNC: 151 MMOL/L (ref 136–145)
SODIUM SERPL-SCNC: 152 MMOL/L (ref 136–145)
SODIUM SERPL-SCNC: 153 MMOL/L (ref 136–145)
SODIUM SERPL-SCNC: 154 MMOL/L (ref 136–145)
SODIUM SERPL-SCNC: 154 MMOL/L (ref 136–145)
SODIUM SERPL-SCNC: 156 MMOL/L (ref 136–145)
SODIUM SERPL-SCNC: 158 MMOL/L (ref 136–145)
SODIUM SERPL-SCNC: 167 MMOL/L (ref 136–145)
SODIUM SERPL-SCNC: 170 MMOL/L (ref 136–145)
SODIUM SERPL-SCNC: 172 MMOL/L (ref 136–145)
SODIUM SERPL-SCNC: 174 MMOL/L (ref 136–145)
SODIUM SERPL-SCNC: 175 MMOL/L (ref 136–145)
SOURCE, COVRS: NORMAL
SP GR UR REFRACTOMETRY: 1.01
SP GR UR REFRACTOMETRY: 1.02 (ref 1–1.03)
SP GR UR REFRACTOMETRY: 1.02 (ref 1–1.03)
SPECIMEN EXP DATE BLD: NORMAL
SPECIMEN EXP DATE BLD: NORMAL
SPECIMEN SITE: ABNORMAL
SPECIMEN SOURCE FLD: ABNORMAL
SPECIMEN TYPE: ABNORMAL
STATUS OF UNIT,%ST: NORMAL
THERAPEUTIC RANGE,PTTT: ABNORMAL SECS (ref 58–77)
THERAPEUTIC RANGE,PTTT: NORMAL SECS (ref 58–77)
THERAPEUTIC RANGE,PTTT: NORMAL SECS (ref 58–77)
TRIGL SERPL-MCNC: 160 MG/DL (ref ?–150)
TROPONIN-HIGH SENSITIVITY: 1007 NG/L (ref 0–51)
TROPONIN-HIGH SENSITIVITY: 11 NG/L (ref 0–51)
TROPONIN-HIGH SENSITIVITY: 12 NG/L (ref 0–51)
TROPONIN-HIGH SENSITIVITY: 16 NG/L (ref 0–51)
TROPONIN-HIGH SENSITIVITY: 20 NG/L (ref 0–51)
TROPONIN-HIGH SENSITIVITY: 24 NG/L (ref 0–51)
TROPONIN-HIGH SENSITIVITY: 35 NG/L (ref 0–51)
TROPONIN-HIGH SENSITIVITY: 995 NG/L (ref 0–51)
TSH SERPL DL<=0.05 MIU/L-ACNC: 0.68 UIU/ML (ref 0.36–3.74)
UNIT DIVISION, %UDIV: 0
UROBILINOGEN UR QL STRIP.AUTO: 0.2 EU/DL (ref 0.2–1)
UROBILINOGEN UR QL STRIP.AUTO: 0.2 EU/DL (ref 0.2–1)
UROBILINOGEN UR QL STRIP.AUTO: 2 EU/DL (ref 0.2–1)
VANCOMYCIN TROUGH SERPL-MCNC: 10.9 UG/ML (ref 5–10)
VANCOMYCIN TROUGH SERPL-MCNC: 15.8 UG/ML (ref 5–10)
VANCOMYCIN TROUGH SERPL-MCNC: 8.7 UG/ML (ref 5–10)
VENTILATION MODE VENT: ABNORMAL
VENTRICULAR RATE, ECG03: 119 BPM
VENTRICULAR RATE, ECG03: 120 BPM
VENTRICULAR RATE, ECG03: 134 BPM
VENTRICULAR RATE, ECG03: 137 BPM
VLDLC SERPL CALC-MCNC: 32 MG/DL
VT SETTING VENT: 350 ML
VT SETTING VENT: 350 ML
VT SETTING VENT: 450 ML
VT SETTING VENT: 500
WBC # BLD AUTO: 10.2 K/UL (ref 3.6–11)
WBC # BLD AUTO: 11.9 K/UL (ref 3.6–11)
WBC # BLD AUTO: 13.3 K/UL (ref 3.6–11)
WBC # BLD AUTO: 13.6 K/UL (ref 3.6–11)
WBC # BLD AUTO: 14.1 K/UL (ref 3.6–11)
WBC # BLD AUTO: 15 K/UL (ref 3.6–11)
WBC # BLD AUTO: 15.1 K/UL (ref 3.6–11)
WBC # BLD AUTO: 15.3 K/UL (ref 3.6–11)
WBC # BLD AUTO: 15.8 K/UL (ref 3.6–11)
WBC # BLD AUTO: 16.4 K/UL (ref 3.6–11)
WBC # BLD AUTO: 16.5 K/UL (ref 3.6–11)
WBC # BLD AUTO: 25.1 K/UL (ref 3.6–11)
WBC # BLD AUTO: 25.7 K/UL (ref 3.6–11)
WBC # BLD AUTO: 30.2 K/UL (ref 3.6–11)
WBC MORPH BLD: ABNORMAL

## 2023-01-01 PROCEDURE — 84100 ASSAY OF PHOSPHORUS: CPT

## 2023-01-01 PROCEDURE — 86923 COMPATIBILITY TEST ELECTRIC: CPT

## 2023-01-01 PROCEDURE — 74011000258 HC RX REV CODE- 258

## 2023-01-01 PROCEDURE — 83735 ASSAY OF MAGNESIUM: CPT

## 2023-01-01 PROCEDURE — 85027 COMPLETE CBC AUTOMATED: CPT

## 2023-01-01 PROCEDURE — 65610000006 HC RM INTENSIVE CARE

## 2023-01-01 PROCEDURE — 36415 COLL VENOUS BLD VENIPUNCTURE: CPT

## 2023-01-01 PROCEDURE — 74011000250 HC RX REV CODE- 250

## 2023-01-01 PROCEDURE — 74011000250 HC RX REV CODE- 250: Performed by: NURSE PRACTITIONER

## 2023-01-01 PROCEDURE — 84484 ASSAY OF TROPONIN QUANT: CPT

## 2023-01-01 PROCEDURE — 74011000258 HC RX REV CODE- 258: Performed by: NURSE PRACTITIONER

## 2023-01-01 PROCEDURE — 94640 AIRWAY INHALATION TREATMENT: CPT

## 2023-01-01 PROCEDURE — 74011250636 HC RX REV CODE- 250/636

## 2023-01-01 PROCEDURE — 88305 TISSUE EXAM BY PATHOLOGIST: CPT

## 2023-01-01 PROCEDURE — 2709999900 HC NON-CHARGEABLE SUPPLY: Performed by: THORACIC SURGERY (CARDIOTHORACIC VASCULAR SURGERY)

## 2023-01-01 PROCEDURE — 85730 THROMBOPLASTIN TIME PARTIAL: CPT

## 2023-01-01 PROCEDURE — 36430 TRANSFUSION BLD/BLD COMPNT: CPT

## 2023-01-01 PROCEDURE — 87075 CULTR BACTERIA EXCEPT BLOOD: CPT

## 2023-01-01 PROCEDURE — 80053 COMPREHEN METABOLIC PANEL: CPT

## 2023-01-01 PROCEDURE — 74011636637 HC RX REV CODE- 636/637

## 2023-01-01 PROCEDURE — C1894 INTRO/SHEATH, NON-LASER: HCPCS | Performed by: STUDENT IN AN ORGANIZED HEALTH CARE EDUCATION/TRAINING PROGRAM

## 2023-01-01 PROCEDURE — 03HY32Z INSERTION OF MONITORING DEVICE INTO UPPER ARTERY, PERCUTANEOUS APPROACH: ICD-10-PCS | Performed by: ANESTHESIOLOGY

## 2023-01-01 PROCEDURE — 93005 ELECTROCARDIOGRAM TRACING: CPT

## 2023-01-01 PROCEDURE — 99291 CRITICAL CARE FIRST HOUR: CPT | Performed by: INTERNAL MEDICINE

## 2023-01-01 PROCEDURE — 82962 GLUCOSE BLOOD TEST: CPT

## 2023-01-01 PROCEDURE — 74011250636 HC RX REV CODE- 250/636: Performed by: ANESTHESIOLOGY

## 2023-01-01 PROCEDURE — 80143 DRUG ASSAY ACETAMINOPHEN: CPT

## 2023-01-01 PROCEDURE — 99152 MOD SED SAME PHYS/QHP 5/>YRS: CPT | Performed by: STUDENT IN AN ORGANIZED HEALTH CARE EDUCATION/TRAINING PROGRAM

## 2023-01-01 PROCEDURE — 82803 BLOOD GASES ANY COMBINATION: CPT

## 2023-01-01 PROCEDURE — 74011000250 HC RX REV CODE- 250: Performed by: NURSE ANESTHETIST, CERTIFIED REGISTERED

## 2023-01-01 PROCEDURE — 71275 CT ANGIOGRAPHY CHEST: CPT

## 2023-01-01 PROCEDURE — 74011250636 HC RX REV CODE- 250/636: Performed by: INTERNAL MEDICINE

## 2023-01-01 PROCEDURE — 94003 VENT MGMT INPAT SUBQ DAY: CPT

## 2023-01-01 PROCEDURE — 86900 BLOOD TYPING SEROLOGIC ABO: CPT

## 2023-01-01 PROCEDURE — 80048 BASIC METABOLIC PNL TOTAL CA: CPT

## 2023-01-01 PROCEDURE — 83605 ASSAY OF LACTIC ACID: CPT

## 2023-01-01 PROCEDURE — 74011250636 HC RX REV CODE- 250/636: Performed by: HOSPITALIST

## 2023-01-01 PROCEDURE — 94669 MECHANICAL CHEST WALL OSCILL: CPT

## 2023-01-01 PROCEDURE — 5A1945Z RESPIRATORY VENTILATION, 24-96 CONSECUTIVE HOURS: ICD-10-PCS | Performed by: INTERNAL MEDICINE

## 2023-01-01 PROCEDURE — 74011250636 HC RX REV CODE- 250/636: Performed by: NURSE ANESTHETIST, CERTIFIED REGISTERED

## 2023-01-01 PROCEDURE — 80202 ASSAY OF VANCOMYCIN: CPT

## 2023-01-01 PROCEDURE — 36620 INSERTION CATHETER ARTERY: CPT | Performed by: THORACIC SURGERY (CARDIOTHORACIC VASCULAR SURGERY)

## 2023-01-01 PROCEDURE — 84443 ASSAY THYROID STIM HORMONE: CPT

## 2023-01-01 PROCEDURE — 71045 X-RAY EXAM CHEST 1 VIEW: CPT

## 2023-01-01 PROCEDURE — 76060000033 HC ANESTHESIA 1 TO 1.5 HR: Performed by: THORACIC SURGERY (CARDIOTHORACIC VASCULAR SURGERY)

## 2023-01-01 PROCEDURE — 99153 MOD SED SAME PHYS/QHP EA: CPT | Performed by: STUDENT IN AN ORGANIZED HEALTH CARE EDUCATION/TRAINING PROGRAM

## 2023-01-01 PROCEDURE — 94002 VENT MGMT INPAT INIT DAY: CPT

## 2023-01-01 PROCEDURE — 88112 CYTOPATH CELL ENHANCE TECH: CPT

## 2023-01-01 PROCEDURE — 82977 ASSAY OF GGT: CPT

## 2023-01-01 PROCEDURE — 74018 RADEX ABDOMEN 1 VIEW: CPT

## 2023-01-01 PROCEDURE — 81001 URINALYSIS AUTO W/SCOPE: CPT

## 2023-01-01 PROCEDURE — 93306 TTE W/DOPPLER COMPLETE: CPT

## 2023-01-01 PROCEDURE — 74011250637 HC RX REV CODE- 250/637: Performed by: NURSE PRACTITIONER

## 2023-01-01 PROCEDURE — C9113 INJ PANTOPRAZOLE SODIUM, VIA: HCPCS | Performed by: NURSE PRACTITIONER

## 2023-01-01 PROCEDURE — 3E03317 INTRODUCTION OF OTHER THROMBOLYTIC INTO PERIPHERAL VEIN, PERCUTANEOUS APPROACH: ICD-10-PCS | Performed by: STUDENT IN AN ORGANIZED HEALTH CARE EDUCATION/TRAINING PROGRAM

## 2023-01-01 PROCEDURE — 77030018729 HC ELECTRD DEFIB PAD CARD -B: Performed by: THORACIC SURGERY (CARDIOTHORACIC VASCULAR SURGERY)

## 2023-01-01 PROCEDURE — P9016 RBC LEUKOCYTES REDUCED: HCPCS

## 2023-01-01 PROCEDURE — 85025 COMPLETE CBC W/AUTO DIFF WBC: CPT

## 2023-01-01 PROCEDURE — 80061 LIPID PANEL: CPT

## 2023-01-01 PROCEDURE — 76010000133 HC OR TIME 3 TO 3.5 HR

## 2023-01-01 PROCEDURE — 87070 CULTURE OTHR SPECIMN AEROBIC: CPT

## 2023-01-01 PROCEDURE — 0B9C8ZX DRAINAGE OF RIGHT UPPER LUNG LOBE, VIA NATURAL OR ARTIFICIAL OPENING ENDOSCOPIC, DIAGNOSTIC: ICD-10-PCS | Performed by: INTERNAL MEDICINE

## 2023-01-01 PROCEDURE — 74011250636 HC RX REV CODE- 250/636: Performed by: STUDENT IN AN ORGANIZED HEALTH CARE EDUCATION/TRAINING PROGRAM

## 2023-01-01 PROCEDURE — B24BZZ4 ULTRASONOGRAPHY OF HEART WITH AORTA, TRANSESOPHAGEAL: ICD-10-PCS | Performed by: ANESTHESIOLOGY

## 2023-01-01 PROCEDURE — 75810000304 HC PLACE NEED INTRAOSSEOUS INFUS

## 2023-01-01 PROCEDURE — 74011250637 HC RX REV CODE- 250/637: Performed by: HOSPITALIST

## 2023-01-01 PROCEDURE — 87205 SMEAR GRAM STAIN: CPT

## 2023-01-01 PROCEDURE — 92950 HEART/LUNG RESUSCITATION CPR: CPT

## 2023-01-01 PROCEDURE — 70450 CT HEAD/BRAIN W/O DYE: CPT

## 2023-01-01 PROCEDURE — 77030034689 HC VASC DIL KT W/NDL LIVA -C: Performed by: THORACIC SURGERY (CARDIOTHORACIC VASCULAR SURGERY)

## 2023-01-01 PROCEDURE — 74011636637 HC RX REV CODE- 636/637: Performed by: ANESTHESIOLOGY

## 2023-01-01 PROCEDURE — 85610 PROTHROMBIN TIME: CPT

## 2023-01-01 PROCEDURE — 76010000149 HC OR TIME 1 TO 1.5 HR: Performed by: THORACIC SURGERY (CARDIOTHORACIC VASCULAR SURGERY)

## 2023-01-01 PROCEDURE — 74011000258 HC RX REV CODE- 258: Performed by: HOSPITALIST

## 2023-01-01 PROCEDURE — 83520 IMMUNOASSAY QUANT NOS NONAB: CPT

## 2023-01-01 PROCEDURE — 76060000037 HC ANESTHESIA 3 TO 3.5 HR

## 2023-01-01 PROCEDURE — 75810000455 HC PLCMT CENT VENOUS CATH LVL 2 5182

## 2023-01-01 PROCEDURE — 74011000636 HC RX REV CODE- 636: Performed by: INTERNAL MEDICINE

## 2023-01-01 PROCEDURE — 36556 INSERT NON-TUNNEL CV CATH: CPT | Performed by: THORACIC SURGERY (CARDIOTHORACIC VASCULAR SURGERY)

## 2023-01-01 PROCEDURE — 80179 DRUG ASSAY SALICYLATE: CPT

## 2023-01-01 PROCEDURE — 82550 ASSAY OF CK (CPK): CPT

## 2023-01-01 PROCEDURE — 74011250636 HC RX REV CODE- 250/636: Performed by: NURSE PRACTITIONER

## 2023-01-01 PROCEDURE — 95816 EEG AWAKE AND DROWSY: CPT | Performed by: HOSPITALIST

## 2023-01-01 PROCEDURE — 0BH17EZ INSERTION OF ENDOTRACHEAL AIRWAY INTO TRACHEA, VIA NATURAL OR ARTIFICIAL OPENING: ICD-10-PCS | Performed by: INTERNAL MEDICINE

## 2023-01-01 PROCEDURE — 84145 PROCALCITONIN (PCT): CPT

## 2023-01-01 PROCEDURE — 65270000029 HC RM PRIVATE

## 2023-01-01 PROCEDURE — 89050 BODY FLUID CELL COUNT: CPT

## 2023-01-01 PROCEDURE — 87635 SARS-COV-2 COVID-19 AMP PRB: CPT

## 2023-01-01 PROCEDURE — 83880 ASSAY OF NATRIURETIC PEPTIDE: CPT

## 2023-01-01 PROCEDURE — 95816 EEG AWAKE AND DROWSY: CPT | Performed by: STUDENT IN AN ORGANIZED HEALTH CARE EDUCATION/TRAINING PROGRAM

## 2023-01-01 PROCEDURE — C1769 GUIDE WIRE: HCPCS | Performed by: STUDENT IN AN ORGANIZED HEALTH CARE EDUCATION/TRAINING PROGRAM

## 2023-01-01 PROCEDURE — 2709999900 HC NON-CHARGEABLE SUPPLY

## 2023-01-01 PROCEDURE — 83036 HEMOGLOBIN GLYCOSYLATED A1C: CPT

## 2023-01-01 PROCEDURE — 37195 THROMBOLYTIC THERAPY STROKE: CPT

## 2023-01-01 PROCEDURE — 99285 EMERGENCY DEPT VISIT HI MDM: CPT

## 2023-01-01 PROCEDURE — 80076 HEPATIC FUNCTION PANEL: CPT

## 2023-01-01 PROCEDURE — 74011000250 HC RX REV CODE- 250: Performed by: STUDENT IN AN ORGANIZED HEALTH CARE EDUCATION/TRAINING PROGRAM

## 2023-01-01 PROCEDURE — 82947 ASSAY GLUCOSE BLOOD QUANT: CPT

## 2023-01-01 PROCEDURE — 74011250637 HC RX REV CODE- 250/637: Performed by: ANESTHESIOLOGY

## 2023-01-01 PROCEDURE — 74011636637 HC RX REV CODE- 636/637: Performed by: NURSE PRACTITIONER

## 2023-01-01 PROCEDURE — 83690 ASSAY OF LIPASE: CPT

## 2023-01-01 PROCEDURE — 85384 FIBRINOGEN ACTIVITY: CPT

## 2023-01-01 PROCEDURE — C1751 CATH, INF, PER/CENT/MIDLINE: HCPCS | Performed by: STUDENT IN AN ORGANIZED HEALTH CARE EDUCATION/TRAINING PROGRAM

## 2023-01-01 PROCEDURE — 77030008543 HC TBNG MON PRSS MRTM -A: Performed by: STUDENT IN AN ORGANIZED HEALTH CARE EDUCATION/TRAINING PROGRAM

## 2023-01-01 PROCEDURE — 31624 DX BRONCHOSCOPE/LAVAGE: CPT | Performed by: THORACIC SURGERY (CARDIOTHORACIC VASCULAR SURGERY)

## 2023-01-01 PROCEDURE — 74011000250 HC RX REV CODE- 250: Performed by: INTERNAL MEDICINE

## 2023-01-01 PROCEDURE — 87086 URINE CULTURE/COLONY COUNT: CPT

## 2023-01-01 PROCEDURE — 71250 CT THORAX DX C-: CPT

## 2023-01-01 PROCEDURE — 76010000379 HC BRONCHOSCOPY DIAG/THERAPEUTIC

## 2023-01-01 PROCEDURE — 99233 SBSQ HOSP IP/OBS HIGH 50: CPT | Performed by: PSYCHIATRY & NEUROLOGY

## 2023-01-01 PROCEDURE — 81003 URINALYSIS AUTO W/O SCOPE: CPT

## 2023-01-01 PROCEDURE — A9539 TC99M PENTETATE: HCPCS

## 2023-01-01 PROCEDURE — 81025 URINE PREGNANCY TEST: CPT

## 2023-01-01 PROCEDURE — 80307 DRUG TEST PRSMV CHEM ANLYZR: CPT

## 2023-01-01 PROCEDURE — C1887 CATHETER, GUIDING: HCPCS | Performed by: STUDENT IN AN ORGANIZED HEALTH CARE EDUCATION/TRAINING PROGRAM

## 2023-01-01 PROCEDURE — 93460 R&L HRT ART/VENTRICLE ANGIO: CPT | Performed by: STUDENT IN AN ORGANIZED HEALTH CARE EDUCATION/TRAINING PROGRAM

## 2023-01-01 PROCEDURE — 74011000258 HC RX REV CODE- 258: Performed by: ANESTHESIOLOGY

## 2023-01-01 PROCEDURE — 02HV33Z INSERTION OF INFUSION DEVICE INTO SUPERIOR VENA CAVA, PERCUTANEOUS APPROACH: ICD-10-PCS | Performed by: ANESTHESIOLOGY

## 2023-01-01 PROCEDURE — 99223 1ST HOSP IP/OBS HIGH 75: CPT | Performed by: NURSE PRACTITIONER

## 2023-01-01 PROCEDURE — 82375 ASSAY CARBOXYHB QUANT: CPT

## 2023-01-01 PROCEDURE — 74011000250 HC RX REV CODE- 250: Performed by: ANESTHESIOLOGY

## 2023-01-01 PROCEDURE — 31500 INSERT EMERGENCY AIRWAY: CPT

## 2023-01-01 PROCEDURE — 5A12012 PERFORMANCE OF CARDIAC OUTPUT, SINGLE, MANUAL: ICD-10-PCS | Performed by: THORACIC SURGERY (CARDIOTHORACIC VASCULAR SURGERY)

## 2023-01-01 PROCEDURE — 74177 CT ABD & PELVIS W/CONTRAST: CPT

## 2023-01-01 PROCEDURE — 93970 EXTREMITY STUDY: CPT

## 2023-01-01 PROCEDURE — 74011000636 HC RX REV CODE- 636: Performed by: STUDENT IN AN ORGANIZED HEALTH CARE EDUCATION/TRAINING PROGRAM

## 2023-01-01 PROCEDURE — 36600 WITHDRAWAL OF ARTERIAL BLOOD: CPT

## 2023-01-01 PROCEDURE — 88331 PATH CONSLTJ SURG 1 BLK 1SPC: CPT

## 2023-01-01 PROCEDURE — 2709999900 HC NON-CHARGEABLE SUPPLY: Performed by: STUDENT IN AN ORGANIZED HEALTH CARE EDUCATION/TRAINING PROGRAM

## 2023-01-01 PROCEDURE — 87040 BLOOD CULTURE FOR BACTERIA: CPT

## 2023-01-01 RX ORDER — CALCIUM GLUCONATE 20 MG/ML
2 INJECTION, SOLUTION INTRAVENOUS ONCE
Status: COMPLETED | OUTPATIENT
Start: 2023-01-01 | End: 2023-01-01

## 2023-01-01 RX ORDER — IPRATROPIUM BROMIDE AND ALBUTEROL SULFATE 2.5; .5 MG/3ML; MG/3ML
3 SOLUTION RESPIRATORY (INHALATION)
Status: DISCONTINUED | OUTPATIENT
Start: 2023-01-01 | End: 2023-01-01 | Stop reason: HOSPADM

## 2023-01-01 RX ORDER — MAGNESIUM SULFATE HEPTAHYDRATE 40 MG/ML
4 INJECTION, SOLUTION INTRAVENOUS ONCE
Status: COMPLETED | OUTPATIENT
Start: 2023-01-01 | End: 2023-01-01

## 2023-01-01 RX ORDER — DEXTROSE MONOHYDRATE 100 MG/ML
0-250 INJECTION, SOLUTION INTRAVENOUS AS NEEDED
Status: DISCONTINUED | OUTPATIENT
Start: 2023-01-01 | End: 2023-01-01

## 2023-01-01 RX ORDER — IBUPROFEN 200 MG
4 TABLET ORAL AS NEEDED
Status: DISCONTINUED | OUTPATIENT
Start: 2023-01-01 | End: 2023-01-01

## 2023-01-01 RX ORDER — ACETAMINOPHEN 325 MG/1
650 TABLET ORAL
Status: DISCONTINUED | OUTPATIENT
Start: 2023-01-01 | End: 2023-01-01

## 2023-01-01 RX ORDER — SODIUM BICARBONATE 1 MEQ/ML
SYRINGE (ML) INTRAVENOUS
Status: COMPLETED | OUTPATIENT
Start: 2023-01-01 | End: 2023-01-01

## 2023-01-01 RX ORDER — SODIUM BICARBONATE 1 MEQ/ML
50 SYRINGE (ML) INTRAVENOUS ONCE
Status: COMPLETED | OUTPATIENT
Start: 2023-01-01 | End: 2023-01-01

## 2023-01-01 RX ORDER — ROCURONIUM BROMIDE 10 MG/ML
INJECTION, SOLUTION INTRAVENOUS AS NEEDED
Status: DISCONTINUED | OUTPATIENT
Start: 2023-01-01 | End: 2023-01-01 | Stop reason: HOSPADM

## 2023-01-01 RX ORDER — NOREPINEPHRINE BITARTRATE/D5W 8 MG/250ML
PLASTIC BAG, INJECTION (ML) INTRAVENOUS
Status: COMPLETED | OUTPATIENT
Start: 2023-01-01 | End: 2023-01-01

## 2023-01-01 RX ORDER — METOPROLOL TARTRATE 5 MG/5ML
5 INJECTION INTRAVENOUS ONCE
Status: COMPLETED | OUTPATIENT
Start: 2023-01-01 | End: 2023-01-01

## 2023-01-01 RX ORDER — FENTANYL CITRATE 50 UG/ML
50 INJECTION, SOLUTION INTRAMUSCULAR; INTRAVENOUS ONCE
Status: COMPLETED | OUTPATIENT
Start: 2023-01-01 | End: 2023-01-01

## 2023-01-01 RX ORDER — VANCOMYCIN 2 GRAM/500 ML IN 0.9 % SODIUM CHLORIDE INTRAVENOUS
2000
Status: COMPLETED | OUTPATIENT
Start: 2023-01-01 | End: 2023-01-01

## 2023-01-01 RX ORDER — SODIUM BICARBONATE 84 MG/ML
INJECTION, SOLUTION INTRAVENOUS
Status: DISPENSED
Start: 2023-01-01 | End: 2023-01-01

## 2023-01-01 RX ORDER — INSULIN LISPRO 100 [IU]/ML
INJECTION, SOLUTION INTRAVENOUS; SUBCUTANEOUS EVERY 6 HOURS
Status: DISCONTINUED | OUTPATIENT
Start: 2023-01-01 | End: 2023-01-01

## 2023-01-01 RX ORDER — SODIUM CHLORIDE, SODIUM LACTATE, POTASSIUM CHLORIDE, CALCIUM CHLORIDE 600; 310; 30; 20 MG/100ML; MG/100ML; MG/100ML; MG/100ML
100 INJECTION, SOLUTION INTRAVENOUS CONTINUOUS
Status: DISCONTINUED | OUTPATIENT
Start: 2023-01-01 | End: 2023-01-01

## 2023-01-01 RX ORDER — IODIXANOL 320 MG/ML
INJECTION, SOLUTION INTRAVASCULAR AS NEEDED
Status: DISCONTINUED | OUTPATIENT
Start: 2023-01-01 | End: 2023-01-01 | Stop reason: HOSPADM

## 2023-01-01 RX ORDER — POTASSIUM CHLORIDE 29.8 MG/ML
20 INJECTION INTRAVENOUS ONCE
Status: COMPLETED | OUTPATIENT
Start: 2023-01-01 | End: 2023-01-01

## 2023-01-01 RX ORDER — DESMOPRESSIN ACETATE 4 UG/ML
1 INJECTION, SOLUTION INTRAVENOUS; SUBCUTANEOUS 3 TIMES DAILY
Status: DISCONTINUED | OUTPATIENT
Start: 2023-01-01 | End: 2023-01-01

## 2023-01-01 RX ORDER — SODIUM CHLORIDE 450 MG/100ML
500 INJECTION, SOLUTION INTRAVENOUS ONCE
Status: COMPLETED | OUTPATIENT
Start: 2023-01-01 | End: 2023-01-01

## 2023-01-01 RX ORDER — MANNITOL 250 MG/ML
INJECTION, SOLUTION INTRAVENOUS AS NEEDED
Status: DISCONTINUED | OUTPATIENT
Start: 2023-01-01 | End: 2023-01-01 | Stop reason: HOSPADM

## 2023-01-01 RX ORDER — DESMOPRESSIN ACETATE 4 UG/ML
1 INJECTION, SOLUTION INTRAVENOUS; SUBCUTANEOUS EVERY 6 HOURS
Status: DISCONTINUED | OUTPATIENT
Start: 2023-01-01 | End: 2023-01-01

## 2023-01-01 RX ORDER — ALBUTEROL SULFATE 0.83 MG/ML
2.5 SOLUTION RESPIRATORY (INHALATION)
Status: DISCONTINUED | OUTPATIENT
Start: 2023-01-01 | End: 2023-01-01 | Stop reason: HOSPADM

## 2023-01-01 RX ORDER — POTASSIUM CHLORIDE 29.8 MG/ML
20 INJECTION INTRAVENOUS
Status: COMPLETED | OUTPATIENT
Start: 2023-01-01 | End: 2023-01-01

## 2023-01-01 RX ORDER — INSULIN LISPRO 100 [IU]/ML
INJECTION, SOLUTION INTRAVENOUS; SUBCUTANEOUS EVERY 4 HOURS
Status: DISCONTINUED | OUTPATIENT
Start: 2023-01-01 | End: 2023-01-01

## 2023-01-01 RX ORDER — FENTANYL CITRATE 50 UG/ML
50 INJECTION, SOLUTION INTRAMUSCULAR; INTRAVENOUS
Status: DISCONTINUED | OUTPATIENT
Start: 2023-01-01 | End: 2023-01-01

## 2023-01-01 RX ORDER — SODIUM CHLORIDE 9 MG/ML
250 INJECTION, SOLUTION INTRAVENOUS AS NEEDED
Status: DISCONTINUED | OUTPATIENT
Start: 2023-01-01 | End: 2023-01-01 | Stop reason: HOSPADM

## 2023-01-01 RX ORDER — CHLORHEXIDINE GLUCONATE 0.12 MG/ML
15 RINSE ORAL EVERY 12 HOURS
Status: DISCONTINUED | OUTPATIENT
Start: 2023-01-01 | End: 2023-01-01 | Stop reason: HOSPADM

## 2023-01-01 RX ORDER — SODIUM CHLORIDE, SODIUM LACTATE, POTASSIUM CHLORIDE, CALCIUM CHLORIDE 600; 310; 30; 20 MG/100ML; MG/100ML; MG/100ML; MG/100ML
INJECTION, SOLUTION INTRAVENOUS
Status: DISCONTINUED | OUTPATIENT
Start: 2023-01-01 | End: 2023-01-01 | Stop reason: HOSPADM

## 2023-01-01 RX ORDER — MIDAZOLAM HYDROCHLORIDE 1 MG/ML
2 INJECTION, SOLUTION INTRAMUSCULAR; INTRAVENOUS ONCE
Status: DISCONTINUED | OUTPATIENT
Start: 2023-01-01 | End: 2023-01-01

## 2023-01-01 RX ORDER — NOREPINEPHRINE BITARTRATE/D5W 8 MG/250ML
.5-3 PLASTIC BAG, INJECTION (ML) INTRAVENOUS
Status: DISCONTINUED | OUTPATIENT
Start: 2023-01-01 | End: 2023-01-01 | Stop reason: HOSPADM

## 2023-01-01 RX ORDER — HEPARIN SODIUM 200 [USP'U]/100ML
INJECTION, SOLUTION INTRAVENOUS
Status: COMPLETED | OUTPATIENT
Start: 2023-01-01 | End: 2023-01-01

## 2023-01-01 RX ORDER — MIDAZOLAM HYDROCHLORIDE 1 MG/ML
2 INJECTION, SOLUTION INTRAMUSCULAR; INTRAVENOUS ONCE
Status: COMPLETED | OUTPATIENT
Start: 2023-01-01 | End: 2023-01-01

## 2023-01-01 RX ORDER — PROPOFOL 10 MG/ML
0-50 VIAL (ML) INTRAVENOUS
Status: DISCONTINUED | OUTPATIENT
Start: 2023-01-01 | End: 2023-01-01

## 2023-01-01 RX ORDER — DEXTROSE 50 % IN WATER (D50W) INTRAVENOUS SYRINGE
50
Status: COMPLETED | OUTPATIENT
Start: 2023-01-01 | End: 2023-01-01

## 2023-01-01 RX ORDER — LABETALOL HYDROCHLORIDE 5 MG/ML
10 INJECTION, SOLUTION INTRAVENOUS
Status: DISCONTINUED | OUTPATIENT
Start: 2023-01-01 | End: 2023-01-01

## 2023-01-01 RX ORDER — DESMOPRESSIN ACETATE 4 UG/ML
1 INJECTION, SOLUTION INTRAVENOUS; SUBCUTANEOUS ONCE
Status: COMPLETED | OUTPATIENT
Start: 2023-01-01 | End: 2023-01-01

## 2023-01-01 RX ORDER — FENTANYL CITRATE 50 UG/ML
INJECTION, SOLUTION INTRAMUSCULAR; INTRAVENOUS
Status: COMPLETED
Start: 2023-01-01 | End: 2023-01-01

## 2023-01-01 RX ORDER — DEXTROSE MONOHYDRATE 50 MG/ML
50 INJECTION, SOLUTION INTRAVENOUS CONTINUOUS
Status: DISCONTINUED | OUTPATIENT
Start: 2023-01-01 | End: 2023-01-01

## 2023-01-01 RX ORDER — HEPARIN SODIUM 1000 [USP'U]/ML
INJECTION, SOLUTION INTRAVENOUS; SUBCUTANEOUS AS NEEDED
Status: DISCONTINUED | OUTPATIENT
Start: 2023-01-01 | End: 2023-01-01 | Stop reason: HOSPADM

## 2023-01-01 RX ORDER — VECURONIUM BROMIDE FOR INJECTION 1 MG/ML
INJECTION, POWDER, LYOPHILIZED, FOR SOLUTION INTRAVENOUS AS NEEDED
Status: DISCONTINUED | OUTPATIENT
Start: 2023-01-01 | End: 2023-01-01 | Stop reason: HOSPADM

## 2023-01-01 RX ORDER — LIDOCAINE HYDROCHLORIDE 10 MG/ML
INJECTION, SOLUTION EPIDURAL; INFILTRATION; INTRACAUDAL; PERINEURAL AS NEEDED
Status: DISCONTINUED | OUTPATIENT
Start: 2023-01-01 | End: 2023-01-01 | Stop reason: HOSPADM

## 2023-01-01 RX ORDER — DEXTROSE MONOHYDRATE 50 MG/ML
125 INJECTION, SOLUTION INTRAVENOUS CONTINUOUS
Status: DISCONTINUED | OUTPATIENT
Start: 2023-01-01 | End: 2023-01-01 | Stop reason: HOSPADM

## 2023-01-01 RX ORDER — 3% SODIUM CHLORIDE 3 G/100ML
50 INJECTION, SOLUTION INTRAVENOUS CONTINUOUS
Status: DISPENSED | OUTPATIENT
Start: 2023-01-01 | End: 2023-01-01

## 2023-01-01 RX ORDER — EPINEPHRINE 0.1 MG/ML
INJECTION INTRACARDIAC; INTRAVENOUS
Status: COMPLETED | OUTPATIENT
Start: 2023-01-01 | End: 2023-01-01

## 2023-01-01 RX ORDER — CALCIUM CHLORIDE INJECTION 100 MG/ML
INJECTION, SOLUTION INTRAVENOUS
Status: COMPLETED | OUTPATIENT
Start: 2023-01-01 | End: 2023-01-01

## 2023-01-01 RX ORDER — SODIUM CHLORIDE 450 MG/100ML
50 INJECTION, SOLUTION INTRAVENOUS CONTINUOUS
Status: DISCONTINUED | OUTPATIENT
Start: 2023-01-01 | End: 2023-01-01 | Stop reason: HOSPADM

## 2023-01-01 RX ORDER — FUROSEMIDE 10 MG/ML
INJECTION INTRAMUSCULAR; INTRAVENOUS AS NEEDED
Status: DISCONTINUED | OUTPATIENT
Start: 2023-01-01 | End: 2023-01-01 | Stop reason: HOSPADM

## 2023-01-01 RX ORDER — MIDAZOLAM HYDROCHLORIDE 1 MG/ML
INJECTION, SOLUTION INTRAMUSCULAR; INTRAVENOUS
Status: DISCONTINUED
Start: 2023-01-01 | End: 2023-01-01

## 2023-01-01 RX ORDER — NOREPINEPHRINE BITARTRATE/D5W 8 MG/250ML
.5-3 PLASTIC BAG, INJECTION (ML) INTRAVENOUS
Status: DISCONTINUED | OUTPATIENT
Start: 2023-01-01 | End: 2023-01-01

## 2023-01-01 RX ORDER — DOBUTAMINE HYDROCHLORIDE 200 MG/100ML
0-5 INJECTION INTRAVENOUS
Status: DISCONTINUED | OUTPATIENT
Start: 2023-01-01 | End: 2023-01-01

## 2023-01-01 RX ORDER — BALSAM PERU/CASTOR OIL
OINTMENT (GRAM) TOPICAL 2 TIMES DAILY
Status: DISCONTINUED | OUTPATIENT
Start: 2023-01-01 | End: 2023-01-01 | Stop reason: HOSPADM

## 2023-01-01 RX ORDER — HYDRALAZINE HYDROCHLORIDE 20 MG/ML
10 INJECTION INTRAMUSCULAR; INTRAVENOUS
Status: DISCONTINUED | OUTPATIENT
Start: 2023-01-01 | End: 2023-01-01

## 2023-01-01 RX ORDER — ENOXAPARIN SODIUM 100 MG/ML
40 INJECTION SUBCUTANEOUS EVERY 24 HOURS
Status: DISCONTINUED | OUTPATIENT
Start: 2023-01-01 | End: 2023-01-01

## 2023-01-01 RX ADMIN — DEXTROSE MONOHYDRATE 350 ML/HR: 50 INJECTION, SOLUTION INTRAVENOUS at 07:08

## 2023-01-01 RX ADMIN — VANCOMYCIN HYDROCHLORIDE 1000 MG: 1 INJECTION, POWDER, LYOPHILIZED, FOR SOLUTION INTRAVENOUS at 11:30

## 2023-01-01 RX ADMIN — DEXTROSE MONOHYDRATE 350 ML/HR: 50 INJECTION, SOLUTION INTRAVENOUS at 19:49

## 2023-01-01 RX ADMIN — PIPERACILLIN AND TAZOBACTAM 3.38 G: 3; .375 INJECTION, POWDER, FOR SOLUTION INTRAVENOUS at 20:29

## 2023-01-01 RX ADMIN — VANCOMYCIN HYDROCHLORIDE 1000 MG: 1 INJECTION, POWDER, LYOPHILIZED, FOR SOLUTION INTRAVENOUS at 12:33

## 2023-01-01 RX ADMIN — Medication 50 MCG/HR: at 15:28

## 2023-01-01 RX ADMIN — CASTOR OIL AND BALSAM, PERU: 788; 87 OINTMENT TOPICAL at 11:44

## 2023-01-01 RX ADMIN — PROPOFOL 40 MCG/KG/MIN: 10 INJECTION, EMULSION INTRAVENOUS at 20:22

## 2023-01-01 RX ADMIN — MANNITOL 12.5 G: 12.5 INJECTION, SOLUTION INTRAVENOUS at 23:52

## 2023-01-01 RX ADMIN — EPINEPHRINE 1 MG: 0.1 INJECTION, SOLUTION ENDOTRACHEAL; INTRACARDIAC; INTRAVENOUS at 19:08

## 2023-01-01 RX ADMIN — POTASSIUM CHLORIDE 20 MEQ: 29.8 INJECTION INTRAVENOUS at 05:00

## 2023-01-01 RX ADMIN — PROPOFOL 50 MCG/KG/MIN: 10 INJECTION, EMULSION INTRAVENOUS at 21:06

## 2023-01-01 RX ADMIN — SODIUM BICARBONATE 50 MEQ: 84 INJECTION INTRAVENOUS at 12:33

## 2023-01-01 RX ADMIN — DEXTROSE MONOHYDRATE 200 ML/HR: 50 INJECTION, SOLUTION INTRAVENOUS at 00:26

## 2023-01-01 RX ADMIN — PIPERACILLIN AND TAZOBACTAM 3.38 G: 3; .375 INJECTION, POWDER, FOR SOLUTION INTRAVENOUS at 13:01

## 2023-01-01 RX ADMIN — CHLORHEXIDINE GLUCONATE 15 ML: 1.2 RINSE ORAL at 21:52

## 2023-01-01 RX ADMIN — PIPERACILLIN AND TAZOBACTAM 4.5 G: 4; .5 INJECTION, POWDER, FOR SOLUTION INTRAVENOUS at 03:57

## 2023-01-01 RX ADMIN — FENTANYL CITRATE 50 MCG: 50 INJECTION, SOLUTION INTRAMUSCULAR; INTRAVENOUS at 19:47

## 2023-01-01 RX ADMIN — POTASSIUM CHLORIDE 20 MEQ: 29.8 INJECTION INTRAVENOUS at 02:10

## 2023-01-01 RX ADMIN — VASOPRESSIN 0.04 UNITS/MIN: 20 INJECTION INTRAVENOUS at 10:10

## 2023-01-01 RX ADMIN — SODIUM CHLORIDE 4.3 UNITS/HR: 9 INJECTION, SOLUTION INTRAVENOUS at 13:21

## 2023-01-01 RX ADMIN — POTASSIUM CHLORIDE 20 MEQ: 29.8 INJECTION INTRAVENOUS at 11:31

## 2023-01-01 RX ADMIN — SODIUM BICARBONATE 50 MEQ: 84 INJECTION, SOLUTION INTRAVENOUS at 19:34

## 2023-01-01 RX ADMIN — METHYLPREDNISOLONE SODIUM SUCCINATE 1000 MG: 1 INJECTION, POWDER, LYOPHILIZED, FOR SOLUTION INTRAMUSCULAR; INTRAVENOUS at 01:27

## 2023-01-01 RX ADMIN — POTASSIUM BICARBONATE 40 MEQ: 782 TABLET, EFFERVESCENT ORAL at 20:49

## 2023-01-01 RX ADMIN — SODIUM CHLORIDE, PRESERVATIVE FREE 40 MG: 5 INJECTION INTRAVENOUS at 23:33

## 2023-01-01 RX ADMIN — Medication 2 UNITS: at 05:07

## 2023-01-01 RX ADMIN — DIBASIC SODIUM PHOSPHATE, MONOBASIC POTASSIUM PHOSPHATE AND MONOBASIC SODIUM PHOSPHATE 2 TABLET: 852; 155; 130 TABLET ORAL at 08:24

## 2023-01-01 RX ADMIN — EPINEPHRINE 1 MG: 0.1 INJECTION, SOLUTION ENDOTRACHEAL; INTRACARDIAC; INTRAVENOUS at 19:02

## 2023-01-01 RX ADMIN — PIPERACILLIN AND TAZOBACTAM 4.5 G: 4; .5 INJECTION, POWDER, FOR SOLUTION INTRAVENOUS at 21:35

## 2023-01-01 RX ADMIN — PIPERACILLIN AND TAZOBACTAM 3.38 G: 3; .375 INJECTION, POWDER, FOR SOLUTION INTRAVENOUS at 21:50

## 2023-01-01 RX ADMIN — CHLORHEXIDINE GLUCONATE 15 ML: 1.2 RINSE ORAL at 20:38

## 2023-01-01 RX ADMIN — VANCOMYCIN HYDROCHLORIDE 1000 MG: 1 INJECTION, POWDER, LYOPHILIZED, FOR SOLUTION INTRAVENOUS at 01:15

## 2023-01-01 RX ADMIN — METHYLPREDNISOLONE SODIUM SUCCINATE 100 MG/HR: 1 INJECTION, POWDER, LYOPHILIZED, FOR SOLUTION INTRAMUSCULAR; INTRAVENOUS at 08:42

## 2023-01-01 RX ADMIN — SODIUM CHLORIDE 0.2 UNITS/HR: 9 INJECTION, SOLUTION INTRAVENOUS at 13:35

## 2023-01-01 RX ADMIN — SODIUM CHLORIDE 50 ML/HR: 3 INJECTION, SOLUTION INTRAVENOUS at 01:29

## 2023-01-01 RX ADMIN — SODIUM CHLORIDE, POTASSIUM CHLORIDE, SODIUM LACTATE AND CALCIUM CHLORIDE 100 ML/HR: 600; 310; 30; 20 INJECTION, SOLUTION INTRAVENOUS at 21:54

## 2023-01-01 RX ADMIN — CHLORHEXIDINE GLUCONATE 15 ML: 1.2 RINSE ORAL at 21:09

## 2023-01-01 RX ADMIN — ALBUTEROL SULFATE 2.5 MG: 2.5 SOLUTION RESPIRATORY (INHALATION) at 19:28

## 2023-01-01 RX ADMIN — DEXTROSE MONOHYDRATE 350 ML/HR: 50 INJECTION, SOLUTION INTRAVENOUS at 23:00

## 2023-01-01 RX ADMIN — SODIUM CHLORIDE 50 ML/HR: 4.5 INJECTION, SOLUTION INTRAVENOUS at 18:28

## 2023-01-01 RX ADMIN — LEVOTHYROXINE SODIUM 20 MCG/HR: 20 INJECTION, SOLUTION INTRAVENOUS at 01:53

## 2023-01-01 RX ADMIN — PIPERACILLIN AND TAZOBACTAM 3.38 G: 3; .375 INJECTION, POWDER, FOR SOLUTION INTRAVENOUS at 05:20

## 2023-01-01 RX ADMIN — POTASSIUM BICARBONATE 40 MEQ: 782 TABLET, EFFERVESCENT ORAL at 15:14

## 2023-01-01 RX ADMIN — Medication 1 AMPULE: at 20:31

## 2023-01-01 RX ADMIN — MAGNESIUM SULFATE HEPTAHYDRATE 4 G: 40 INJECTION, SOLUTION INTRAVENOUS at 06:00

## 2023-01-01 RX ADMIN — POTASSIUM CHLORIDE 20 MEQ: 29.8 INJECTION, SOLUTION INTRAVENOUS at 08:04

## 2023-01-01 RX ADMIN — FENTANYL CITRATE 50 MCG: 50 INJECTION, SOLUTION INTRAMUSCULAR; INTRAVENOUS at 20:54

## 2023-01-01 RX ADMIN — CASTOR OIL AND BALSAM, PERU: 788; 87 OINTMENT TOPICAL at 08:44

## 2023-01-01 RX ADMIN — VANCOMYCIN HYDROCHLORIDE 750 MG: 750 INJECTION, POWDER, LYOPHILIZED, FOR SOLUTION INTRAVENOUS at 12:14

## 2023-01-01 RX ADMIN — ALBUTEROL SULFATE 2.5 MG: 2.5 SOLUTION RESPIRATORY (INHALATION) at 08:57

## 2023-01-01 RX ADMIN — CASTOR OIL AND BALSAM, PERU: 788; 87 OINTMENT TOPICAL at 08:33

## 2023-01-01 RX ADMIN — SODIUM CHLORIDE, PRESERVATIVE FREE 40 MG: 5 INJECTION INTRAVENOUS at 20:16

## 2023-01-01 RX ADMIN — PIPERACILLIN AND TAZOBACTAM 3.38 G: 3; .375 INJECTION, POWDER, FOR SOLUTION INTRAVENOUS at 20:21

## 2023-01-01 RX ADMIN — POTASSIUM CHLORIDE 20 MEQ: 29.8 INJECTION, SOLUTION INTRAVENOUS at 20:50

## 2023-01-01 RX ADMIN — VASOPRESSIN 0.01 UNITS/MIN: 20 INJECTION INTRAVENOUS at 01:21

## 2023-01-01 RX ADMIN — DIBASIC SODIUM PHOSPHATE, MONOBASIC POTASSIUM PHOSPHATE AND MONOBASIC SODIUM PHOSPHATE 2 TABLET: 852; 155; 130 TABLET ORAL at 18:00

## 2023-01-01 RX ADMIN — VASOPRESSIN 0.04 UNITS/MIN: 20 INJECTION INTRAVENOUS at 18:26

## 2023-01-01 RX ADMIN — MIDAZOLAM 2 MG: 1 INJECTION INTRAMUSCULAR; INTRAVENOUS at 15:42

## 2023-01-01 RX ADMIN — CHLORHEXIDINE GLUCONATE 15 ML: 1.2 RINSE ORAL at 20:14

## 2023-01-01 RX ADMIN — DESMOPRESSIN ACETATE 1 MCG: 4 SOLUTION INTRAVENOUS at 20:32

## 2023-01-01 RX ADMIN — ALBUTEROL SULFATE 2.5 MG: 2.5 SOLUTION RESPIRATORY (INHALATION) at 11:05

## 2023-01-01 RX ADMIN — ALBUTEROL SULFATE 2.5 MG: 2.5 SOLUTION RESPIRATORY (INHALATION) at 16:05

## 2023-01-01 RX ADMIN — SODIUM CHLORIDE, PRESERVATIVE FREE 40 MG: 5 INJECTION INTRAVENOUS at 08:06

## 2023-01-01 RX ADMIN — METHYLPREDNISOLONE SODIUM SUCCINATE 100 MG/HR: 1 INJECTION, POWDER, LYOPHILIZED, FOR SOLUTION INTRAMUSCULAR; INTRAVENOUS at 02:27

## 2023-01-01 RX ADMIN — Medication 1 AMPULE: at 21:51

## 2023-01-01 RX ADMIN — LEVOTHYROXINE SODIUM ANHYDROUS 20 MCG/HR: 100 INJECTION, POWDER, LYOPHILIZED, FOR SOLUTION INTRAVENOUS at 17:07

## 2023-01-01 RX ADMIN — POTASSIUM CHLORIDE 20 MEQ: 29.8 INJECTION INTRAVENOUS at 03:10

## 2023-01-01 RX ADMIN — POTASSIUM CHLORIDE 20 MEQ: 29.8 INJECTION, SOLUTION INTRAVENOUS at 11:31

## 2023-01-01 RX ADMIN — POTASSIUM CHLORIDE 20 MEQ: 29.8 INJECTION, SOLUTION INTRAVENOUS at 01:47

## 2023-01-01 RX ADMIN — SODIUM CHLORIDE 500 ML: 9 INJECTION, SOLUTION INTRAVENOUS at 18:13

## 2023-01-01 RX ADMIN — VANCOMYCIN HYDROCHLORIDE 750 MG: 750 INJECTION, POWDER, LYOPHILIZED, FOR SOLUTION INTRAVENOUS at 11:43

## 2023-01-01 RX ADMIN — Medication 1 AMPULE: at 20:00

## 2023-01-01 RX ADMIN — DOBUTAMINE HYDROCHLORIDE 2.5 MCG/KG/MIN: 200 INJECTION INTRAVENOUS at 03:50

## 2023-01-01 RX ADMIN — POTASSIUM CHLORIDE 20 MEQ: 29.8 INJECTION, SOLUTION INTRAVENOUS at 06:33

## 2023-01-01 RX ADMIN — DEXTROSE MONOHYDRATE 100 ML/HR: 50 INJECTION, SOLUTION INTRAVENOUS at 16:23

## 2023-01-01 RX ADMIN — SODIUM CHLORIDE, PRESERVATIVE FREE 40 MG: 5 INJECTION INTRAVENOUS at 21:50

## 2023-01-01 RX ADMIN — DIBASIC SODIUM PHOSPHATE, MONOBASIC POTASSIUM PHOSPHATE AND MONOBASIC SODIUM PHOSPHATE 2 TABLET: 852; 155; 130 TABLET ORAL at 17:36

## 2023-01-01 RX ADMIN — VANCOMYCIN HYDROCHLORIDE 750 MG: 750 INJECTION, POWDER, LYOPHILIZED, FOR SOLUTION INTRAVENOUS at 00:55

## 2023-01-01 RX ADMIN — EPINEPHRINE 10 MCG/MIN: 1 INJECTION INTRAMUSCULAR; INTRAVENOUS; SUBCUTANEOUS at 19:15

## 2023-01-01 RX ADMIN — Medication 1 AMPULE: at 08:34

## 2023-01-01 RX ADMIN — DIBASIC SODIUM PHOSPHATE, MONOBASIC POTASSIUM PHOSPHATE AND MONOBASIC SODIUM PHOSPHATE 2 TABLET: 852; 155; 130 TABLET ORAL at 08:43

## 2023-01-01 RX ADMIN — PIPERACILLIN AND TAZOBACTAM 3.38 G: 3; .375 INJECTION, POWDER, FOR SOLUTION INTRAVENOUS at 05:00

## 2023-01-01 RX ADMIN — ACETAMINOPHEN 650 MG: 325 SUPPOSITORY RECTAL at 04:27

## 2023-01-01 RX ADMIN — POTASSIUM CHLORIDE 20 MEQ: 29.8 INJECTION, SOLUTION INTRAVENOUS at 06:38

## 2023-01-01 RX ADMIN — Medication 4 MCG/MIN: at 01:42

## 2023-01-01 RX ADMIN — FUROSEMIDE 100 MG: 10 INJECTION, SOLUTION INTRAMUSCULAR; INTRAVENOUS at 23:48

## 2023-01-01 RX ADMIN — METHYLPREDNISOLONE SODIUM SUCCINATE 100 MG/HR: 1 INJECTION, POWDER, LYOPHILIZED, FOR SOLUTION INTRAMUSCULAR; INTRAVENOUS at 08:50

## 2023-01-01 RX ADMIN — PROPOFOL 50 MCG/KG/MIN: 10 INJECTION, EMULSION INTRAVENOUS at 01:56

## 2023-01-01 RX ADMIN — METOPROLOL TARTRATE 5 MG: 5 INJECTION, SOLUTION INTRAVENOUS at 10:05

## 2023-01-01 RX ADMIN — MIDAZOLAM 2 MG: 1 INJECTION INTRAMUSCULAR; INTRAVENOUS at 20:54

## 2023-01-01 RX ADMIN — PROPOFOL 40 MCG/KG/MIN: 10 INJECTION, EMULSION INTRAVENOUS at 11:35

## 2023-01-01 RX ADMIN — VANCOMYCIN HYDROCHLORIDE 2000 MG: 10 INJECTION, POWDER, LYOPHILIZED, FOR SOLUTION INTRAVENOUS at 00:04

## 2023-01-01 RX ADMIN — NOREPINEPHRINE BITARTRATE 12 MCG/MIN: 1 SOLUTION INTRAVENOUS at 23:59

## 2023-01-01 RX ADMIN — PIPERACILLIN AND TAZOBACTAM 4.5 G: 4; .5 INJECTION, POWDER, FOR SOLUTION INTRAVENOUS at 10:26

## 2023-01-01 RX ADMIN — DIBASIC SODIUM PHOSPHATE, MONOBASIC POTASSIUM PHOSPHATE AND MONOBASIC SODIUM PHOSPHATE 2 TABLET: 852; 155; 130 TABLET ORAL at 08:35

## 2023-01-01 RX ADMIN — PIPERACILLIN AND TAZOBACTAM 3.38 G: 3; .375 INJECTION, POWDER, FOR SOLUTION INTRAVENOUS at 14:22

## 2023-01-01 RX ADMIN — PIPERACILLIN AND TAZOBACTAM 3.38 G: 3; .375 INJECTION, POWDER, FOR SOLUTION INTRAVENOUS at 13:36

## 2023-01-01 RX ADMIN — VECURONIUM BROMIDE 10 MG: 10 INJECTION, POWDER, LYOPHILIZED, FOR SOLUTION INTRAVENOUS at 22:49

## 2023-01-01 RX ADMIN — ALBUTEROL SULFATE 2.5 MG: 2.5 SOLUTION RESPIRATORY (INHALATION) at 07:23

## 2023-01-01 RX ADMIN — PIPERACILLIN AND TAZOBACTAM 4.5 G: 4; .5 INJECTION, POWDER, FOR SOLUTION INTRAVENOUS at 21:36

## 2023-01-01 RX ADMIN — SODIUM CHLORIDE 5 MG/HR: 9 INJECTION, SOLUTION INTRAVENOUS at 01:03

## 2023-01-01 RX ADMIN — SODIUM CHLORIDE, PRESERVATIVE FREE 40 MG: 5 INJECTION INTRAVENOUS at 08:43

## 2023-01-01 RX ADMIN — DIBASIC SODIUM PHOSPHATE, MONOBASIC POTASSIUM PHOSPHATE AND MONOBASIC SODIUM PHOSPHATE 2 TABLET: 852; 155; 130 TABLET ORAL at 22:39

## 2023-01-01 RX ADMIN — VANCOMYCIN HYDROCHLORIDE 750 MG: 750 INJECTION, POWDER, LYOPHILIZED, FOR SOLUTION INTRAVENOUS at 11:42

## 2023-01-01 RX ADMIN — CASTOR OIL AND BALSAM, PERU: 788; 87 OINTMENT TOPICAL at 20:13

## 2023-01-01 RX ADMIN — ALBUTEROL SULFATE 2.5 MG: 2.5 SOLUTION RESPIRATORY (INHALATION) at 10:52

## 2023-01-01 RX ADMIN — POTASSIUM CHLORIDE 20 MEQ: 29.8 INJECTION INTRAVENOUS at 13:41

## 2023-01-01 RX ADMIN — VASOPRESSIN 0.02 UNITS/MIN: 20 INJECTION INTRAVENOUS at 07:00

## 2023-01-01 RX ADMIN — SODIUM CHLORIDE, PRESERVATIVE FREE 40 MG: 5 INJECTION INTRAVENOUS at 09:00

## 2023-01-01 RX ADMIN — Medication 1 AMPULE: at 20:13

## 2023-01-01 RX ADMIN — ALBUTEROL SULFATE 2.5 MG: 2.5 SOLUTION RESPIRATORY (INHALATION) at 03:17

## 2023-01-01 RX ADMIN — VANCOMYCIN HYDROCHLORIDE 1000 MG: 1 INJECTION, POWDER, LYOPHILIZED, FOR SOLUTION INTRAVENOUS at 17:06

## 2023-01-01 RX ADMIN — SODIUM CHLORIDE 500 ML: 4.5 INJECTION, SOLUTION INTRAVENOUS at 19:19

## 2023-01-01 RX ADMIN — VANCOMYCIN HYDROCHLORIDE 750 MG: 750 INJECTION, POWDER, LYOPHILIZED, FOR SOLUTION INTRAVENOUS at 23:01

## 2023-01-01 RX ADMIN — POTASSIUM CHLORIDE 20 MEQ: 29.8 INJECTION INTRAVENOUS at 08:35

## 2023-01-01 RX ADMIN — VANCOMYCIN HYDROCHLORIDE 1000 MG: 1 INJECTION, POWDER, LYOPHILIZED, FOR SOLUTION INTRAVENOUS at 23:00

## 2023-01-01 RX ADMIN — METHYLPREDNISOLONE SODIUM SUCCINATE 100 MG/HR: 1 INJECTION, POWDER, LYOPHILIZED, FOR SOLUTION INTRAMUSCULAR; INTRAVENOUS at 13:54

## 2023-01-01 RX ADMIN — DEXTROSE MONOHYDRATE 350 ML/HR: 50 INJECTION, SOLUTION INTRAVENOUS at 13:48

## 2023-01-01 RX ADMIN — CHLORHEXIDINE GLUCONATE 15 ML: 1.2 RINSE ORAL at 08:17

## 2023-01-01 RX ADMIN — Medication 20 MCG/MIN: at 19:12

## 2023-01-01 RX ADMIN — IPRATROPIUM BROMIDE AND ALBUTEROL SULFATE 3 ML: 2.5; .5 SOLUTION RESPIRATORY (INHALATION) at 04:21

## 2023-01-01 RX ADMIN — Medication 2 UNITS: at 08:44

## 2023-01-01 RX ADMIN — SODIUM CHLORIDE, PRESERVATIVE FREE 40 MG: 5 INJECTION INTRAVENOUS at 21:08

## 2023-01-01 RX ADMIN — PIPERACILLIN AND TAZOBACTAM 4.5 G: 4; .5 INJECTION, POWDER, FOR SOLUTION INTRAVENOUS at 23:29

## 2023-01-01 RX ADMIN — DEXTROSE MONOHYDRATE 350 ML/HR: 50 INJECTION, SOLUTION INTRAVENOUS at 02:00

## 2023-01-01 RX ADMIN — DESMOPRESSIN ACETATE 1 MCG: 4 SOLUTION INTRAVENOUS at 16:47

## 2023-01-01 RX ADMIN — CASTOR OIL AND BALSAM, PERU: 788; 87 OINTMENT TOPICAL at 21:51

## 2023-01-01 RX ADMIN — ALBUTEROL SULFATE 2.5 MG: 2.5 SOLUTION RESPIRATORY (INHALATION) at 20:19

## 2023-01-01 RX ADMIN — PROPOFOL 40 MCG/KG/MIN: 10 INJECTION, EMULSION INTRAVENOUS at 06:25

## 2023-01-01 RX ADMIN — POTASSIUM BICARBONATE 40 MEQ: 782 TABLET, EFFERVESCENT ORAL at 00:59

## 2023-01-01 RX ADMIN — Medication 1 AMPULE: at 08:17

## 2023-01-01 RX ADMIN — ENOXAPARIN SODIUM 40 MG: 100 INJECTION SUBCUTANEOUS at 11:42

## 2023-01-01 RX ADMIN — Medication 4 MCG/MIN: at 22:13

## 2023-01-01 RX ADMIN — SODIUM BICARBONATE 50 MEQ: 84 INJECTION, SOLUTION INTRAVENOUS at 20:02

## 2023-01-01 RX ADMIN — HEPARIN SODIUM 30000 UNITS: 1000 INJECTION, SOLUTION INTRAVENOUS; SUBCUTANEOUS at 23:55

## 2023-01-01 RX ADMIN — DESMOPRESSIN ACETATE 1 MCG: 4 SOLUTION INTRAVENOUS at 15:15

## 2023-01-01 RX ADMIN — CHLORHEXIDINE GLUCONATE 15 ML: 1.2 RINSE ORAL at 08:08

## 2023-01-01 RX ADMIN — VASOPRESSIN 0.02 UNITS/MIN: 20 INJECTION INTRAVENOUS at 10:08

## 2023-01-01 RX ADMIN — DEXTROSE MONOHYDRATE 350 ML/HR: 50 INJECTION, SOLUTION INTRAVENOUS at 04:10

## 2023-01-01 RX ADMIN — PIPERACILLIN AND TAZOBACTAM 3.38 G: 3; .375 INJECTION, POWDER, FOR SOLUTION INTRAVENOUS at 04:30

## 2023-01-01 RX ADMIN — SODIUM CHLORIDE, PRESERVATIVE FREE 40 MG: 5 INJECTION INTRAVENOUS at 20:21

## 2023-01-01 RX ADMIN — IPRATROPIUM BROMIDE AND ALBUTEROL SULFATE 3 ML: 2.5; .5 SOLUTION RESPIRATORY (INHALATION) at 00:51

## 2023-01-01 RX ADMIN — POTASSIUM CHLORIDE 20 MEQ: 29.8 INJECTION, SOLUTION INTRAVENOUS at 08:06

## 2023-01-01 RX ADMIN — PIPERACILLIN AND TAZOBACTAM 3.38 G: 3; .375 INJECTION, POWDER, FOR SOLUTION INTRAVENOUS at 13:21

## 2023-01-01 RX ADMIN — PROPOFOL 50 MCG/KG/MIN: 10 INJECTION, EMULSION INTRAVENOUS at 01:18

## 2023-01-01 RX ADMIN — ENOXAPARIN SODIUM 40 MG: 100 INJECTION SUBCUTANEOUS at 11:43

## 2023-01-01 RX ADMIN — SODIUM CHLORIDE 11.3 UNITS/HR: 9 INJECTION, SOLUTION INTRAVENOUS at 22:50

## 2023-01-01 RX ADMIN — ROCURONIUM BROMIDE 50 MG: 10 INJECTION INTRAVENOUS at 23:11

## 2023-01-01 RX ADMIN — ACETAMINOPHEN 650 MG: 325 TABLET ORAL at 19:51

## 2023-01-01 RX ADMIN — PIPERACILLIN AND TAZOBACTAM 3.38 G: 3; .375 INJECTION, POWDER, FOR SOLUTION INTRAVENOUS at 06:05

## 2023-01-01 RX ADMIN — Medication 8 MCG/MIN: at 14:15

## 2023-01-01 RX ADMIN — DEXTROSE MONOHYDRATE 125 ML/HR: 50 INJECTION, SOLUTION INTRAVENOUS at 07:11

## 2023-01-01 RX ADMIN — ENOXAPARIN SODIUM 40 MG: 100 INJECTION SUBCUTANEOUS at 11:30

## 2023-01-01 RX ADMIN — DESMOPRESSIN ACETATE 1 MCG: 4 SOLUTION INTRAVENOUS at 06:18

## 2023-01-01 RX ADMIN — LABETALOL HYDROCHLORIDE 10 MG: 5 INJECTION INTRAVENOUS at 13:21

## 2023-01-01 RX ADMIN — PIPERACILLIN AND TAZOBACTAM 3.38 G: 3; .375 INJECTION, POWDER, FOR SOLUTION INTRAVENOUS at 05:07

## 2023-01-01 RX ADMIN — DEXTROSE MONOHYDRATE 350 ML/HR: 50 INJECTION, SOLUTION INTRAVENOUS at 10:05

## 2023-01-01 RX ADMIN — DEXTROSE MONOHYDRATE 250 ML: 100 INJECTION, SOLUTION INTRAVENOUS at 08:50

## 2023-01-01 RX ADMIN — ENOXAPARIN SODIUM 40 MG: 100 INJECTION SUBCUTANEOUS at 10:32

## 2023-01-01 RX ADMIN — PIPERACILLIN AND TAZOBACTAM 4.5 G: 4; .5 INJECTION, POWDER, FOR SOLUTION INTRAVENOUS at 13:48

## 2023-01-01 RX ADMIN — PIPERACILLIN AND TAZOBACTAM 3.38 G: 3; .375 INJECTION, POWDER, FOR SOLUTION INTRAVENOUS at 20:13

## 2023-01-01 RX ADMIN — Medication 6 MCG/MIN: at 22:26

## 2023-01-01 RX ADMIN — Medication 5 UNITS: at 00:36

## 2023-01-01 RX ADMIN — Medication 2 UNITS: at 00:24

## 2023-01-01 RX ADMIN — Medication 2 UNITS: at 16:00

## 2023-01-01 RX ADMIN — SODIUM CHLORIDE 11.6 UNITS/HR: 9 INJECTION, SOLUTION INTRAVENOUS at 13:22

## 2023-01-01 RX ADMIN — SODIUM CHLORIDE, POTASSIUM CHLORIDE, SODIUM LACTATE AND CALCIUM CHLORIDE 50 ML/HR: 600; 310; 30; 20 INJECTION, SOLUTION INTRAVENOUS at 21:07

## 2023-01-01 RX ADMIN — CHLORHEXIDINE GLUCONATE 15 ML: 1.2 RINSE ORAL at 08:34

## 2023-01-01 RX ADMIN — VANCOMYCIN HYDROCHLORIDE 750 MG: 750 INJECTION, POWDER, LYOPHILIZED, FOR SOLUTION INTRAVENOUS at 11:16

## 2023-01-01 RX ADMIN — POTASSIUM CHLORIDE 20 MEQ: 29.8 INJECTION, SOLUTION INTRAVENOUS at 09:38

## 2023-01-01 RX ADMIN — SODIUM CHLORIDE 50 ML/HR: 4.5 INJECTION, SOLUTION INTRAVENOUS at 22:55

## 2023-01-01 RX ADMIN — CALCIUM CHLORIDE 1 G: 100 INJECTION, SOLUTION INTRAVENOUS at 19:21

## 2023-01-01 RX ADMIN — SODIUM CHLORIDE, PRESERVATIVE FREE 40 MG: 5 INJECTION INTRAVENOUS at 08:34

## 2023-01-01 RX ADMIN — Medication 5 MCG/MIN: at 11:50

## 2023-01-01 RX ADMIN — CHLORHEXIDINE GLUCONATE 15 ML: 1.2 RINSE ORAL at 23:11

## 2023-01-01 RX ADMIN — SODIUM CHLORIDE 1000 ML: 9 INJECTION, SOLUTION INTRAVENOUS at 19:50

## 2023-01-01 RX ADMIN — VECURONIUM BROMIDE 10 MG: 10 INJECTION, POWDER, LYOPHILIZED, FOR SOLUTION INTRAVENOUS at 23:01

## 2023-01-01 RX ADMIN — EPINEPHRINE 10 MCG/MIN: 1 INJECTION INTRAMUSCULAR; INTRAVENOUS; SUBCUTANEOUS at 00:00

## 2023-01-01 RX ADMIN — LABETALOL HYDROCHLORIDE 10 MG: 5 INJECTION INTRAVENOUS at 09:40

## 2023-01-01 RX ADMIN — PIPERACILLIN AND TAZOBACTAM 3.38 G: 3; .375 INJECTION, POWDER, FOR SOLUTION INTRAVENOUS at 21:09

## 2023-01-01 RX ADMIN — FENTANYL CITRATE 50 MCG: 50 INJECTION, SOLUTION INTRAMUSCULAR; INTRAVENOUS at 10:23

## 2023-01-01 RX ADMIN — POTASSIUM CHLORIDE 20 MEQ: 29.8 INJECTION, SOLUTION INTRAVENOUS at 00:37

## 2023-01-01 RX ADMIN — PROPOFOL 40 MCG/KG/MIN: 10 INJECTION, EMULSION INTRAVENOUS at 11:25

## 2023-01-01 RX ADMIN — VANCOMYCIN HYDROCHLORIDE 750 MG: 750 INJECTION, POWDER, LYOPHILIZED, FOR SOLUTION INTRAVENOUS at 00:24

## 2023-01-01 RX ADMIN — Medication 3 UNITS: at 04:55

## 2023-01-01 RX ADMIN — POTASSIUM CHLORIDE 20 MEQ: 29.8 INJECTION, SOLUTION INTRAVENOUS at 08:35

## 2023-01-01 RX ADMIN — DEXTROSE MONOHYDRATE 125 ML/HR: 50 INJECTION, SOLUTION INTRAVENOUS at 08:43

## 2023-01-01 RX ADMIN — DEXTROSE MONOHYDRATE 75 ML/HR: 50 INJECTION, SOLUTION INTRAVENOUS at 13:41

## 2023-01-01 RX ADMIN — ALBUTEROL SULFATE 2.5 MG: 2.5 SOLUTION RESPIRATORY (INHALATION) at 23:17

## 2023-01-01 RX ADMIN — METHYLPREDNISOLONE SODIUM SUCCINATE 100 MG/HR: 1 INJECTION, POWDER, LYOPHILIZED, FOR SOLUTION INTRAMUSCULAR; INTRAVENOUS at 18:48

## 2023-01-01 RX ADMIN — ACETAMINOPHEN 650 MG: 325 TABLET ORAL at 15:42

## 2023-01-01 RX ADMIN — PIPERACILLIN AND TAZOBACTAM 3.38 G: 3; .375 INJECTION, POWDER, FOR SOLUTION INTRAVENOUS at 13:44

## 2023-01-01 RX ADMIN — POTASSIUM CHLORIDE 20 MEQ: 29.8 INJECTION INTRAVENOUS at 01:00

## 2023-01-01 RX ADMIN — CALCIUM GLUCONATE 2 G: 20 INJECTION, SOLUTION INTRAVENOUS at 05:32

## 2023-01-01 RX ADMIN — POTASSIUM PHOSPHATE, MONOBASIC POTASSIUM PHOSPHATE, DIBASIC: 224; 236 INJECTION, SOLUTION, CONCENTRATE INTRAVENOUS at 23:16

## 2023-01-01 RX ADMIN — CHLORHEXIDINE GLUCONATE 15 ML: 1.2 RINSE ORAL at 20:05

## 2023-01-01 RX ADMIN — ALBUTEROL SULFATE 2.5 MG: 2.5 SOLUTION RESPIRATORY (INHALATION) at 15:23

## 2023-01-01 RX ADMIN — POTASSIUM CHLORIDE 20 MEQ: 29.8 INJECTION INTRAVENOUS at 23:09

## 2023-01-01 RX ADMIN — MIDAZOLAM 2 MG: 1 INJECTION INTRAMUSCULAR; INTRAVENOUS at 00:51

## 2023-01-01 RX ADMIN — SODIUM CHLORIDE, PRESERVATIVE FREE 40 MG: 5 INJECTION INTRAVENOUS at 08:05

## 2023-01-01 RX ADMIN — Medication 1 AMPULE: at 08:43

## 2023-01-01 RX ADMIN — POTASSIUM CHLORIDE 20 MEQ: 29.8 INJECTION INTRAVENOUS at 06:55

## 2023-01-01 RX ADMIN — PROPOFOL 40 MCG/KG/MIN: 10 INJECTION, EMULSION INTRAVENOUS at 06:57

## 2023-01-01 RX ADMIN — DESMOPRESSIN ACETATE 1 MCG: 4 SOLUTION INTRAVENOUS at 03:01

## 2023-01-01 RX ADMIN — PIPERACILLIN AND TAZOBACTAM 4.5 G: 4; .5 INJECTION, POWDER, FOR SOLUTION INTRAVENOUS at 07:15

## 2023-01-01 RX ADMIN — CASTOR OIL AND BALSAM, PERU: 788; 87 OINTMENT TOPICAL at 20:53

## 2023-01-01 RX ADMIN — IOPAMIDOL 100 ML: 755 INJECTION, SOLUTION INTRAVENOUS at 20:26

## 2023-01-01 RX ADMIN — HYDRALAZINE HYDROCHLORIDE 10 MG: 20 INJECTION INTRAMUSCULAR; INTRAVENOUS at 15:54

## 2023-01-01 RX ADMIN — CHLORHEXIDINE GLUCONATE 15 ML: 1.2 RINSE ORAL at 08:43

## 2023-01-01 RX ADMIN — SODIUM CHLORIDE, POTASSIUM CHLORIDE, SODIUM LACTATE AND CALCIUM CHLORIDE: 600; 310; 30; 20 INJECTION, SOLUTION INTRAVENOUS at 22:49

## 2023-01-01 RX ADMIN — POTASSIUM CHLORIDE 20 MEQ: 29.8 INJECTION INTRAVENOUS at 03:00

## 2023-01-01 RX ADMIN — TENECTEPLASE 50 MG: KIT at 19:26

## 2023-01-01 RX ADMIN — VANCOMYCIN HYDROCHLORIDE 1000 MG: 1 INJECTION, POWDER, LYOPHILIZED, FOR SOLUTION INTRAVENOUS at 01:18

## 2023-01-01 RX ADMIN — POTASSIUM PHOSPHATE, MONOBASIC POTASSIUM PHOSPHATE, DIBASIC: 224; 236 INJECTION, SOLUTION, CONCENTRATE INTRAVENOUS at 08:04

## 2023-01-01 RX ADMIN — CHLORHEXIDINE GLUCONATE 15 ML: 1.2 RINSE ORAL at 09:25

## 2023-01-01 RX ADMIN — SODIUM BICARBONATE 50 MEQ: 84 INJECTION, SOLUTION INTRAVENOUS at 19:53

## 2023-01-01 RX ADMIN — SODIUM CHLORIDE, POTASSIUM CHLORIDE, SODIUM LACTATE AND CALCIUM CHLORIDE 100 ML/HR: 600; 310; 30; 20 INJECTION, SOLUTION INTRAVENOUS at 08:05

## 2023-01-01 RX ADMIN — DEXTROSE MONOHYDRATE 350 ML/HR: 50 INJECTION, SOLUTION INTRAVENOUS at 16:43

## 2023-01-01 RX ADMIN — VASOPRESSIN 0.03 UNITS/MIN: 20 INJECTION INTRAVENOUS at 16:02

## 2023-01-01 RX ADMIN — VASOPRESSIN 0.04 UNITS/MIN: 20 INJECTION INTRAVENOUS at 23:50

## 2023-01-01 RX ADMIN — PROPOFOL 50 MCG/KG/MIN: 10 INJECTION, EMULSION INTRAVENOUS at 15:50

## 2023-01-01 RX ADMIN — POTASSIUM CHLORIDE 20 MEQ: 29.8 INJECTION INTRAVENOUS at 20:24

## 2023-01-01 RX ADMIN — METHYLPREDNISOLONE SODIUM SUCCINATE 100 MG/HR: 1 INJECTION, POWDER, LYOPHILIZED, FOR SOLUTION INTRAMUSCULAR; INTRAVENOUS at 22:34

## 2023-01-01 RX ADMIN — DEXTROSE MONOHYDRATE 125 ML: 100 INJECTION, SOLUTION INTRAVENOUS at 20:32

## 2023-01-01 RX ADMIN — MANNITOL 12.5 G: 12.5 INJECTION, SOLUTION INTRAVENOUS at 23:49

## 2023-01-01 RX ADMIN — Medication 4 MCG/MIN: at 06:22

## 2023-01-01 RX ADMIN — SODIUM CHLORIDE 4.1 UNITS/HR: 9 INJECTION, SOLUTION INTRAVENOUS at 09:06

## 2023-01-01 RX ADMIN — PIPERACILLIN AND TAZOBACTAM 4.5 G: 4; .5 INJECTION, POWDER, FOR SOLUTION INTRAVENOUS at 16:05

## 2023-01-01 RX ADMIN — DEXTROSE MONOHYDRATE 25 G: 25 INJECTION, SOLUTION INTRAVENOUS at 03:26

## 2023-01-01 RX ADMIN — DEXTROSE MONOHYDRATE 50 ML/HR: 50 INJECTION, SOLUTION INTRAVENOUS at 17:48

## 2023-01-01 RX ADMIN — POTASSIUM CHLORIDE 20 MEQ: 29.8 INJECTION INTRAVENOUS at 18:14

## 2023-01-01 RX ADMIN — SODIUM CHLORIDE, PRESERVATIVE FREE 40 MG: 5 INJECTION INTRAVENOUS at 20:29

## 2023-01-01 RX ADMIN — VANCOMYCIN HYDROCHLORIDE 1000 MG: 1 INJECTION, POWDER, LYOPHILIZED, FOR SOLUTION INTRAVENOUS at 10:48

## 2023-01-11 ENCOUNTER — ANESTHESIA EVENT (OUTPATIENT)
Dept: CARDIOTHORACIC SURGERY | Age: 28
DRG: 059 | End: 2023-01-11
Payer: MEDICAID

## 2023-01-11 ENCOUNTER — HOSPITAL ENCOUNTER (OUTPATIENT)
Dept: NON INVASIVE DIAGNOSTICS | Age: 28
Discharge: HOME | End: 2023-01-11
Attending: THORACIC SURGERY (CARDIOTHORACIC VASCULAR SURGERY)

## 2023-01-11 ENCOUNTER — ANESTHESIA (OUTPATIENT)
Dept: CARDIOTHORACIC SURGERY | Age: 28
DRG: 059 | End: 2023-01-11
Payer: MEDICAID

## 2023-01-11 PROBLEM — I46.9 CARDIAC ARREST (HCC): Status: ACTIVE | Noted: 2023-01-01

## 2023-01-11 PROBLEM — I51.9 HEART DISEASE: Status: ACTIVE | Noted: 2023-01-01

## 2023-01-11 PROCEDURE — 74011250636 HC RX REV CODE- 250/636: Performed by: NURSE ANESTHETIST, CERTIFIED REGISTERED

## 2023-01-11 PROCEDURE — 77030005401 HC CATH RAD ARRO -A: Performed by: NURSE ANESTHETIST, CERTIFIED REGISTERED

## 2023-01-11 PROCEDURE — 74011000258 HC RX REV CODE- 258: Performed by: NURSE ANESTHETIST, CERTIFIED REGISTERED

## 2023-01-11 PROCEDURE — 77030013797 HC KT TRNSDUC PRSSR EDWD -A: Performed by: NURSE ANESTHETIST, CERTIFIED REGISTERED

## 2023-01-11 PROCEDURE — 74011000250 HC RX REV CODE- 250: Performed by: NURSE ANESTHETIST, CERTIFIED REGISTERED

## 2023-01-11 RX ORDER — PROPOFOL 10 MG/ML
INJECTION, EMULSION INTRAVENOUS
Status: DISCONTINUED | OUTPATIENT
Start: 2023-01-11 | End: 2023-01-11 | Stop reason: HOSPADM

## 2023-01-11 RX ORDER — SODIUM CHLORIDE 9 MG/ML
INJECTION, SOLUTION INTRAVENOUS
Status: DISCONTINUED | OUTPATIENT
Start: 2023-01-11 | End: 2023-01-11 | Stop reason: HOSPADM

## 2023-01-11 RX ORDER — VECURONIUM BROMIDE FOR INJECTION 1 MG/ML
INJECTION, POWDER, LYOPHILIZED, FOR SOLUTION INTRAVENOUS AS NEEDED
Status: DISCONTINUED | OUTPATIENT
Start: 2023-01-11 | End: 2023-01-11 | Stop reason: HOSPADM

## 2023-01-11 RX ORDER — NOREPINEPHRINE BITARTRATE/D5W 8 MG/250ML
PLASTIC BAG, INJECTION (ML) INTRAVENOUS
Status: DISCONTINUED | OUTPATIENT
Start: 2023-01-11 | End: 2023-01-11 | Stop reason: HOSPADM

## 2023-01-11 RX ADMIN — SODIUM CHLORIDE: 9 INJECTION, SOLUTION INTRAVENOUS at 20:30

## 2023-01-11 RX ADMIN — VECURONIUM BROMIDE 5 MG: 10 INJECTION, POWDER, LYOPHILIZED, FOR SOLUTION INTRAVENOUS at 22:07

## 2023-01-11 RX ADMIN — VECURONIUM BROMIDE 5 MG: 10 INJECTION, POWDER, LYOPHILIZED, FOR SOLUTION INTRAVENOUS at 20:47

## 2023-01-11 RX ADMIN — PROPOFOL 40 MCG/KG/MIN: 10 INJECTION, EMULSION INTRAVENOUS at 21:49

## 2023-01-11 RX ADMIN — VASOPRESSIN 0.02 UNITS/MIN: 20 INJECTION INTRAVENOUS at 21:34

## 2023-01-11 RX ADMIN — EPINEPHRINE 10 MCG/MIN: 1 INJECTION INTRAMUSCULAR; INTRAVENOUS; SUBCUTANEOUS at 20:30

## 2023-01-11 RX ADMIN — Medication 15 MCG/KG/MIN: at 20:30

## 2023-01-12 PROBLEM — G25.3 MYOCLONUS: Status: ACTIVE | Noted: 2023-01-01

## 2023-01-12 PROBLEM — R57.0 CARDIOGENIC SHOCK (HCC): Status: ACTIVE | Noted: 2023-01-01

## 2023-01-12 PROBLEM — E11.65 TYPE 2 DIABETES MELLITUS WITH HYPERGLYCEMIA, WITHOUT LONG-TERM CURRENT USE OF INSULIN (HCC): Status: ACTIVE | Noted: 2023-01-01

## 2023-01-12 PROBLEM — E87.20 LACTIC ACIDOSIS: Status: ACTIVE | Noted: 2023-01-01

## 2023-01-12 PROBLEM — J96.01 ACUTE RESPIRATORY FAILURE WITH HYPOXIA (HCC): Status: ACTIVE | Noted: 2023-01-12

## 2023-01-12 PROCEDURE — 74011250636 HC RX REV CODE- 250/636: Performed by: NURSE ANESTHETIST, CERTIFIED REGISTERED

## 2023-01-12 NOTE — CONSULTS
Neurology Note    Patient ID:  Bobbe Runner  [id number]  57 y.o.  1995      Date of Consultation:  January 12, 2023    Referring Physician: Dr. Wilfrid Bianchi    Reason for Consultation:  encephalopathy      Assessment and Plan:    The patient is a 41-year-old female with out of hospital cardiac arrest.  Her neurological examination does reveal pupil reactivity, involuntary and irregular myoclonic jerking, and no purposeful limb movement. Cardiac arrest with concern for anoxic brain injury:    Rapid EEG from overnight revealed no increased seizure burden. Head CT with no acute abnormality    The patient should receive a emergent EEG this morning to determine if there are subclinical seizures and whether the current myoclonus is based in epilepsy. This has been ordered. Continue to correct any metabolic derangements   (electrolyte abnormalities, elevated LFTs, elevated creatinine)    I will continue to follow neurological examination closely,  Pending changes, additional neuroimaging may be necessary. Additional insight into prognosis pending how the patient's neurological condition progresses over the first 72 hours. I discussed the patient with the intensivist today. Neurology will continue to follow closely during the hospitalization. Subjective: no verbal output       History of Present Illness:   Bobbe Runner is a 28 y.o. female who was brought to the hospital due to out of hospital cardiac arrest x2. It appears that she had had generalized weakness and decreased appetite earlier in the day. She had a witnessed syncopal event at home before becoming pulseless with EMS. The patient had profound hypotension and hypoxia. The patient was given lytic therapy for possible embolic event. Discussions were held about possible ECMO therapy but there is currently no indication. The patient was having generalized myoclonus.   A rapid EEG was performed overnight which revealed no evidence of increased seizure burden. Neurology was consulted for evaluation of patient's neurological status with concern for anoxic brain injury. Pertinent laboratory results upon admission include a white blood cell count of 30.2, platelets 537, sodium 150, potassium 2.9, creatinine 1.93, , , lactic acid 9.3, troponin 995. Past Medical History:   Diagnosis Date    Psychiatric disorder     anxiety, depression      I am unable to obtain a surgical history from the patient due to her current mental status.     I am unable to obtain a family history from the patient this morning due to her mental status    Social History     Tobacco Use    Smoking status: Never    Smokeless tobacco: Not on file   Substance Use Topics    Alcohol use: Not Currently        Allergies   Allergen Reactions    Escitalopram Other (comments)     Muscle pain and heart palpitations    Zoloft [Sertraline] Other (comments)     insomnia        Prior to Admission medications    Not on File     Current Facility-Administered Medications   Medication Dose Route Frequency    DOBUTamine (DOBUTREX) 500 mg/250 mL (2,000 mcg/mL) infusion  0-5 mcg/kg/min IntraVENous TITRATE    acetaminophen (TYLENOL) tablet 650 mg  650 mg Oral Q4H PRN    Or    acetaminophen (TYLENOL) suppository 650 mg  650 mg Rectal Q4H PRN    potassium chloride 20 mEq in 50 ml IVPB  20 mEq IntraVENous Q1H    insulin regular (NOVOLIN R, HUMULIN R) 100 Units in 0.9% sodium chloride 100 mL infusion  0-50 Units/hr IntraVENous TITRATE    glucose chewable tablet 16 g  4 Tablet Oral PRN    glucagon (GLUCAGEN) injection 1 mg  1 mg IntraMUSCular PRN    dextrose 10% infusion 0-250 mL  0-250 mL IntraVENous PRN    pantoprazole (PROTONIX) 40 mg in 0.9% sodium chloride 10 mL injection  40 mg IntraVENous Q12H    chlorhexidine (ORAL CARE KIT) 0.12 % mouthwash 15 mL  15 mL Oral Q12H    piperacillin-tazobactam (ZOSYN) 3.375 g in 0.9% sodium chloride (MBP/ADV) 100 mL MBP  3.375 g IntraVENous Q8H    vancomycin (VANCOCIN) 750 mg in 0.9% sodium chloride 250 mL (Eoke3Gsh)  750 mg IntraVENous Q12H    vasopressin (VASOSTRICT) 20 Units in 0.9% sodium chloride 100 mL infusion  0.04 Units/min IntraVENous CONTINUOUS    EPINEPHrine (ADRENALIN) 5 mg in 0.9% sodium chloride 250 mL infusion  0-10 mcg/min IntraVENous TITRATE    propofol (DIPRIVAN) 10 mg/mL infusion  0-50 mcg/kg/min IntraVENous TITRATE       Review of Systems:        [x]Unable to obtain  ROS due to  []mental status change  [x]sedated   [x]intubated    Objective:     Visit Vitals  BP (!) 136/97   Pulse (!) 120   Temp 98.9 °F (37.2 °C)   Resp 26   Ht 5' 7\" (1.702 m)   Wt 185 lb (83.9 kg)   SpO2 99%   BMI 28.98 kg/m²       Physical Exam:    Neurological examination    Neck: no carotid bruits  Lungs: clear to auscultation  Heart:  no murmurs, regular rate  Lower extremity: no edema    Language: coma    Cranial nerves:   Perrrla  Fundoscopic examination attempted, but difficult to visualize fundus  Facial sensation/motor:  no grimace to noxious stimulation  Corneal reflex present  Oculocephalic reflex absent    The patient does have irregular, involuntary myoclonic jerks predominantly of facial musculature. It is seen to a lesser extent in the upper extremities. Motor: Tone decreased throughout  No evidence of fasciculations  No purposeful spontaneous limb movements      Sensory:  Upper extremity: no withdrawal to painful stimuli bilaterally  Lower extremity: no withdrawal to painful stimuli bilaterally      Reflexes:    Right Left  Biceps  2 2  Triceps             2 2  Brachiorad.  2 2  Patella  2 2  Achilles - -    Plantar response:  flexor bilaterally      Cerebellar testing:  abnormal involuntary myoclonic movements      Gait: unable to assess due to cognitive status    Labs:     Lab Results   Component Value Date/Time    Sodium 150 (H) 01/12/2023 04:45 AM    Potassium 2.9 (L) 01/12/2023 04:45 AM    Chloride 118 (H) 01/12/2023 04:45 AM    Glucose 237 (H) 01/12/2023 04:45 AM    BUN 22 (H) 01/12/2023 04:45 AM    Creatinine 1.93 (H) 01/12/2023 04:45 AM    Calcium 8.1 (L) 01/12/2023 04:45 AM    WBC 30.2 (H) 01/12/2023 04:45 AM    HCT 37.4 01/12/2023 04:45 AM    HGB 13.1 01/12/2023 04:45 AM    PLATELET 519 29/64/5902 04:45 AM       Imaging:    No results found for this or any previous visit. Results from Hospital Encounter encounter on 01/11/23    CT ABD PELV W CONT    Narrative  CLINICAL HISTORY: Status post cardiac arrest    INDICATION:  Status post cardiac arrest  COMPARISON:  None    TECHNIQUE:  Following the uneventful intravenous administration of 100 cc Isovue-370, thin  axial images were obtained through the chest, abdomen and pelvis. Coronal and  sagittal reconstructions were generated. Oral contrast was not administered. CT dose reduction was achieved through use of a standardized protocol tailored  for this examination and automatic exposure control for dose modulation. Adaptive statistical iterative reconstruction (ASIR) was utilized. For the chest portion of the examination only;  3-D MIP reconstructed imaging was obtained. FINDINGS:  VASCULATURE: Three-vessel aortic arch. Proximal cervical vasculature is patent. No aortic aneurysm or dissection. No proximal pulmonary embolism is identified. MEDIASTINUM/HEART: There is an ET tube and NG tube in place  No hilar or  mediastinal lymphadenopathy. No esophageal mass. No endotracheal or  endobronchial mass. PLEURA/LUNGS: Dense consolidation in the right upper lobe, right lower lobe and  left lower lobe with associated groundglass opacity. There is no pleural or  pericardial effusion. SOFT TISSUE/ AXILLA: No axillary adenopathy. No chest wall mass. LIVER/GALLBLADDER: Hepatic steatosis. There is no intrahepatic duct dilatation. The CBD is not dilated. There is no hepatic parenchymal mass. Hepatic  enhancement pattern is within normal limits.  The portal vein is patent. SPLEEN/PANCREAS: No mass. . There is no pancreatic mass. There is no pancreatic  duct dilatation. There is no evidence of splenomegaly. ADRENALS/KIDNEYS: No mass, calculus, or hydronephrosis. . There is no adrenal  mass. There is no hydroureter or hydronephrosis. There is no perinephric mass. No ureteral or bladder calculus. STOMACH, COLON AND SMALL BOWEL: No dilatation or wall thickening. There is no  obstruction or free intraperitoneal air. There is no evidence of incarceration  or obstruction. No mesenteric adenopathy. APPENDIX: Unremarkable. PERITONEUM/RETROPERITONEUM: There is no abdominal aortic aneurysm. No  significant lymphadenopathy. URINARY BLADDER: There is a Pagan catheter in place  PELVIS: There is no pelvic adenopathy. There is no pelvic sidewall mass. There  is no inguinal adenopathy. BONES: No destructive bone lesion. . No lytic or blastic lesions. No evidence of  fracture or dislocation. Impression  Dense multi lobar pneumonia most conspicuous in the right upper and right lower  lobes. No evidence of pulmonary embolism. No aortic aneurysm or dissection. No additional process is identified in the chest, abdomen or pelvis. I did independently review the head CT from January 11, 2023. There is no acute abnormalities. 45 minutes was spent providing medical care of this critically ill patient reviewing records, obtaining additional history from family, examining patient , discussing with collaborating physicians and nursing, and discussing treatment plans.         Active Problems:    Cardiac arrest ([facility] [address] (1/11/2023)      Heart disease (1/11/2023)                   Signed By:  Harjinder Wooten DO FAAN    January 12, 2023

## 2023-01-12 NOTE — PROGRESS NOTES
Code Shock called. Supervisor responded to bedside, intensivist already at bedside. Necessary parties notified of code and patient status by myself, the intensivist and ED staff per policy. Received confirmation needed team members were on their way.      Case number received from [facility], [facility]: [id number]

## 2023-01-12 NOTE — OP NOTES
Date of Service: 2023/1/11    This woman collapsed at home and suffered cardiac arrest while on route to the hospital.    We think that the period of pulselessness was approximately 10 minutes. The OR team was called emergently to the hospital in the event that ECMO would be required. CT scan was obtained immediately. The head CT was unremarkable. Chest CT demonstrated no evidence of pulmonary embolism or aortic dissection. The most notable finding was that the right upper lobe was completely consolidated. There was some consolidation on the left side of the chest as well. The airways appeared clear as well. The patient was brought to the operating room and an arterial line was placed. A bronchoscopy showed clear airways with bloody fluid lavaged from the RUL. A transesophageal echo was performed. This demonstrated a well-functioning left ventricle and a right ventricle that was dilated and strained. On low doses of epinephrine and norepinephrine her blood pressure was in an acceptable range. Her oxygen saturations were in the 90s. A blood gas demonstrated pH of 7 and a PO2 in the 80s. Her EKG did not show signs of ischemia. I discussed the case at length with Dr. Hans Thompson and Dr. Wade Joshi. We agreed that we should stabilize her overnight and reassess everything after several hours of stability. Central line and PA catheter were placed in the operating room. Her PA pressures were 25/13. She was transferred to the ICU for observation. Bridgett Blanca MD, PhD, [facility].

## 2023-01-12 NOTE — H&P
I agree with this consultation note. I personally interviewed and examined the patient, and reviewed their diagnostic studies in detail. I have also discussed the case in detail with the referring provider. This is a very perplexing case for which we do not yet have a clear diagnosis. This woman collapsed at home and suffered cardiac arrest while on route to the hospital.    We think that the period of pulselessness was approximately 10 minutes. The OR team was called emergently to the hospital in the event that ECMO would be required. CT scan was obtained immediately. The head CT was unremarkable. Chest CT demonstrated no evidence of pulmonary embolism or aortic dissection. The most notable finding was that the right upper lobe was completely consolidated. There was some consolidation on the left side of the chest as well. The airways appeared clear as well. The patient was brought to the operating room and an arterial line was placed. A transesophageal echo was performed. This demonstrated a well-functioning left ventricle and a right ventricle that was dilated and strained. On low doses of epinephrine and norepinephrine her blood pressure was in an acceptable range. Her oxygen saturations were in the 90s. A blood gas demonstrated pH of 7 and a PO2 in the 80s. Her EKG did not show signs of ischemia. I discussed the case at length with Dr. Rigo Desir and Dr. Rocky Armando. We agreed that we should stabilize her overnight and reassess everything after several hours of stability. Central line and PA catheter were placed in the operating room. Her PA pressures were 25/13. She will be transferred to the ICU for observation tonight. Tahir Grier MD, PhD, [facility]. CSS   History and Physical    Subjective:      Briana Aggarwal is a 28 y.o. female who was referred for cardiac evaluation.  Patient apparently passed out at home, EMS was called and patient was found to be pulseless on the way to the hospital . CPR initiated, unclear on how long patient went without pulse. We estimate about 10 minutes. Code shock called, patient was hypotensive and hypoxic, intubated and sedated. ROSC obtained. CT surgery asked to evaluate for possible ECMO. CT scan of chest performed and negative for PE or Aortic Dissection. Positive for consolidation of RUL. Patient moved to the OR in anticipation of needing ECMO. Bronchoscopy performed in OR without obvious mucous plug but concerns for pulmonary hemorrhage from right upper lobe. ANDREW showed dilated and slightly sluggish RV with strain, normal LV function. No indication at this time for ECMO. Patient transferred to ICU for continued support. Cardiac Testing    Cardiac catheterization:    ECHO:ANDREW formal report pending    Past Medical History:   Diagnosis Date    Psychiatric disorder     anxiety, depression     No past surgical history on file. Social History     Tobacco Use    Smoking status: Never    Smokeless tobacco: Not on file   Substance Use Topics    Alcohol use: Not Currently      No family history on file.   Prior to Admission medications    Not on File       Allergies   Allergen Reactions    Escitalopram Other (comments)     Muscle pain and heart palpitations    Zoloft [Sertraline] Other (comments)     insomnia         Review of Systems:   Patient intubated and sedated    Objective:     VS:     Physical Exam:    Intubated and sedated in OR    Labs:   Recent Labs     01/11/23 1926 01/11/23 1925   WBC  --  13.6*   HGB  --  12.7   HCT  --  40.6   PLT  --  114*   NA  --  146*   K  --  3.6   BUN  --  14   CREA  --  1.53*   GLU  --  278*   GLUCPOC 379*  --        Diagnostics:   PA and lateral:    Carotid doppler:     PFTS-FEV1:     EKG:   Assessment:     Active Problems:    Cardiac arrest ([facility] [address] (1/11/2023)      Right upper lobe pulmonary hemorrhage unclear etiology    Plan:     ICU for continued support    Signed By: Flora Andrade PA-C January 11, 2023

## 2023-01-12 NOTE — ANESTHESIA PREPROCEDURE EVALUATION
Relevant Problems   No relevant active problems       Anesthetic History   No history of anesthetic complications            Review of Systems / Medical History  Patient summary reviewed, nursing notes reviewed and pertinent labs reviewed    Pulmonary                Comments: ?PE    Neuro/Psych         Psychiatric history     Cardiovascular                  Exercise tolerance: <4 METS  Comments: Cardiac arrest- presents with hypoxia and severe acidosis   GI/Hepatic/Renal  Within defined limits              Endo/Other  Within defined limits           Other Findings              Physical Exam    Airway  Mallampati: II  TM Distance: 4 - 6 cm  Neck ROM: normal range of motion   Mouth opening: Normal     Cardiovascular    Rhythm: regular  Rate: abnormal         Dental    Dentition: Upper dentition intact and Lower dentition intact     Pulmonary      Decreased breath sounds: right           Abdominal  GI exam deferred       Other Findings            Anesthetic Plan    ASA: 5, emergent  Anesthesia type: general    Monitoring Plan: Arterial line, CVP, ANDREW and [place]    Post procedure ventilation   Induction: Intravenous    Emergency case- two physician consent

## 2023-01-12 NOTE — ED PROVIDER NOTES
EMERGENCY DEPARTMENT HISTORY AND PHYSICAL EXAM      Date: 1/11/2023  Patient Name: Nayan Ledezma    History of Presenting Illness     History Provided By: EMS and family    HPI: Nayan Ledezma, 28 y.o. female with a past medical history significant for medical problems as stated below presents to the ED with EMS in cardiac arrest.  They report that they were called to the family's house because she was withdrawn and acting abnormally as she usually does during her menstrual cycles. EMS was unable to provide further history - hx was difficult to obtain due to the urgency of the moment though. They report that that when she was in the ambulance she was not responding to them, but did seem to be interacting with the environment. A few minutes prior to arrival she became unresponsive and an EKG was performed which read \"STEMI\". On arrival she was pulseless. After resuscitating the patient I was able to gather further information with family. They report that she had a syncopal episode. She suddenly syncopized but able to be lowered to the ground without her hitting her head. They report that afterward she reported to family that she felt like she was going to die. They state that over the past year she has become significantly withdrawn during her menstrual periods and this had been ongoing for the past few days but was starting to get a little bit better and more towards her normal.  She has a history of anxiety and depression, but they do not report any history of suicidal ideations or overdose. She had not been eating or drinking much over past few days. PCP: Christiano Salter MD    No current facility-administered medications on file prior to encounter. No current outpatient medications on file prior to encounter.      Past History     Past Medical History:  Past Medical History:   Diagnosis Date    Psychiatric disorder     anxiety, depression     Past Surgical History:  No past surgical history on file.    Family History:  No family history on file. Social History:  Social History     Tobacco Use    Smoking status: Never   Substance Use Topics    Alcohol use: Not Currently    Drug use: Never       Allergies: Allergies   Allergen Reactions    Escitalopram Other (comments)     Muscle pain and heart palpitations    Zoloft [Sertraline] Other (comments)     insomnia     Review of Systems   Review of Systems  Unable to be obtained    Physical Exam   Physical Exam  Unresponsive  Pupils 3mm and nonreactive  Dry mucous membranes  Tachycardic when ROSC obtained  Rhonchorous breath sounds bilaterally  No assymetric peripheral edema  No rashes  No motor or verbal response to stimuli    Diagnostic Study Results     Labs -     Recent Results (from the past 24 hour(s))   CBC WITH AUTOMATED DIFF    Collection Time: 01/11/23  7:25 PM   Result Value Ref Range    WBC 13.6 (H) 3.6 - 11.0 K/uL    RBC 4.05 3.80 - 5.20 M/uL    HGB 12.7 11.5 - 16.0 g/dL    HCT 40.6 35.0 - 47.0 %    .2 (H) 80.0 - 99.0 FL    MCH 31.4 26.0 - 34.0 PG    MCHC 31.3 30.0 - 36.5 g/dL    RDW 12.3 11.5 - 14.5 %    PLATELET 452 (L) 031 - 400 K/uL    MPV 9.8 8.9 - 12.9 FL    NRBC 0.1 (H) 0  WBC    ABSOLUTE NRBC 0.02 (H) 0.00 - 0.01 K/uL    NEUTROPHILS 35 32 - 75 %    LYMPHOCYTES 53 (H) 12 - 49 %    MONOCYTES 8 5 - 13 %    EOSINOPHILS 1 0 - 7 %    BASOPHILS 0 0 - 1 %    METAMYELOCYTES 1 %    MYELOCYTES 2 %    IMMATURE GRANULOCYTES 0 0.0 - 0.5 %    ABS. NEUTROPHILS 4.8 1.8 - 8.0 K/UL    ABS. LYMPHOCYTES 7.2 (H) 0.8 - 3.5 K/UL    ABS. MONOCYTES 1.1 (H) 0.0 - 1.0 K/UL    ABS. EOSINOPHILS 0.1 0.0 - 0.4 K/UL    ABS. BASOPHILS 0.0 0.0 - 0.1 K/UL    ABS. IMM.  GRANS. 0.0 0.00 - 0.04 K/UL    DF MANUAL      RBC COMMENTS MACROCYTOSIS  1+        WBC COMMENTS ATYPICAL LYMPHOCYTES PRESENT     METABOLIC PANEL, COMPREHENSIVE    Collection Time: 01/11/23  7:25 PM   Result Value Ref Range    Sodium 146 (H) 136 - 145 mmol/L    Potassium 3.6 3.5 - 5.1 mmol/L Chloride 111 (H) 97 - 108 mmol/L    CO2 10 (LL) 21 - 32 mmol/L    Anion gap 25 (H) 5 - 15 mmol/L    Glucose 278 (H) 65 - 100 mg/dL    BUN 14 6 - 20 MG/DL    Creatinine 1.53 (H) 0.55 - 1.02 MG/DL    BUN/Creatinine ratio 9 (L) 12 - 20      eGFR 47 (L) >60 ml/min/1.73m2    Calcium 8.3 (L) 8.5 - 10.1 MG/DL    Bilirubin, total 0.9 0.2 - 1.0 MG/DL    ALT (SGPT) 453 (H) 12 - 78 U/L    AST (SGOT) 464 (H) 15 - 37 U/L    Alk.  phosphatase 92 45 - 117 U/L    Protein, total 6.7 6.4 - 8.2 g/dL    Albumin 3.2 (L) 3.5 - 5.0 g/dL    Globulin 3.5 2.0 - 4.0 g/dL    A-G Ratio 0.9 (L) 1.1 - 2.2     NT-PRO BNP    Collection Time: 01/11/23  7:25 PM   Result Value Ref Range    NT pro-BNP 19 <125 PG/ML   TROPONIN-HIGH SENSITIVITY    Collection Time: 01/11/23  7:25 PM   Result Value Ref Range    Troponin-High Sensitivity 35 0 - 51 ng/L   LIPASE    Collection Time: 01/11/23  7:25 PM   Result Value Ref Range    Lipase 133 73 - 393 U/L   MAGNESIUM    Collection Time: 01/11/23  7:25 PM   Result Value Ref Range    Magnesium 2.5 (H) 1.6 - 2.4 mg/dL   ACETAMINOPHEN    Collection Time: 01/11/23  7:25 PM   Result Value Ref Range    Acetaminophen level <2 (L) 10 - 30 ug/mL   SALICYLATE    Collection Time: 01/11/23  7:25 PM   Result Value Ref Range    Salicylate level <5.4 (L) 2.8 - 20.0 MG/DL   BLOOD GAS,CHEM8,LACTIC ACID POC    Collection Time: 01/11/23  7:26 PM   Result Value Ref Range    Calcium, ionized (POC) 2.42 (HH) 1.12 - 1.32 mmol/L    Sample source VENOUS BLOOD      Sodium,  (H) 136 - 145 MMOL/L    Potassium, POC 3.8 3.5 - 5.5 MMOL/L    Chloride,  (H) 100 - 108 MMOL/L    Glucose,  (H) 74 - 106 MG/DL    Creatinine, POC 1.8 (H) 0.6 - 1.3 MG/DL    Lactic Acid (POC) >18.00 (HH) 0.40 - 2.00 mmol/L    Critical value read back BEST     pH, venous (POC) 6.70 (LL) 7.32 - 7.42      pCO2, venous (POC) >110.0 (H) 41 - 51 MMHG    pO2, venous (POC) 18 (L) 25 - 40 mmHg   POC G3 - PUL    Collection Time: 01/11/23  7:50 PM   Result Value Ref Range    FIO2 (POC) 100 %    pH (POC) 6.92 (LL) 7.35 - 7.45      pCO2 (POC) 77.4 (H) 35.0 - 45.0 MMHG    pO2 (POC) 96 80 - 100 MMHG    HCO3 (POC) 15.8 (L) 22 - 26 MMOL/L    sO2 (POC) 89.7 (L) 92 - 97 %    Base deficit (POC) 17.5 mmol/L    Site LEFT RADIAL      Device: ADULT VENT      Mode PRESSURE CONTROL      Set Rate 28 bpm    PEEP/CPAP (POC) 14 cmH2O    PIP (POC) 12      Allens test (POC) Positive      Specimen type (POC) ARTERIAL      Critical value read back JUAN R    EKG, 12 LEAD, INITIAL    Collection Time: 01/11/23  7:54 PM   Result Value Ref Range    Ventricular Rate 134 BPM    Atrial Rate 134 BPM    P-R Interval 152 ms    QRS Duration 82 ms    Q-T Interval 290 ms    QTC Calculation (Bezet) 433 ms    Calculated P Axis 66 degrees    Calculated R Axis 60 degrees    Calculated T Axis 40 degrees    Diagnosis       Sinus tachycardia  Nonspecific ST abnormality  No previous ECGs available     BLOOD GAS, VENOUS    Collection Time: 01/11/23 10:34 PM   Result Value Ref Range    VENOUS PH 7.28 (L) 7.32 - 7.42      VENOUS PCO2 34.1 (L) 41 - 51 mmHg    VENOUS PO2 32 25 - 40 mmHg    VENOUS BICARBONATE 16 (L) 23 - 28 mmol/L    VENOUS BASE DEFICIT 10.1 mmol/L    VENOUS O2 SATURATION 54 (L) 65 - 88 %    O2 METHOD VENT      Sample source VENOUS BLOOD      SITE SG     URINALYSIS W/ RFLX MICROSCOPIC    Collection Time: 01/11/23 10:36 PM   Result Value Ref Range    Color DARK YELLOW      Appearance CLOUDY (A) CLEAR      Specific gravity 1.020 1.003 - 1.030      pH (UA) 6.5 5.0 - 8.0      Protein 300 (A) NEG mg/dL    Glucose 500 (A) NEG mg/dL    Ketone TRACE (A) NEG mg/dL    Bilirubin Negative NEG      Blood LARGE (A) NEG      Urobilinogen 2.0 (H) 0.2 - 1.0 EU/dL    Nitrites Negative NEG      Leukocyte Esterase Negative NEG     DRUG SCREEN, URINE    Collection Time: 01/11/23 10:36 PM   Result Value Ref Range    AMPHETAMINES Negative NEG      BARBITURATES Negative NEG      BENZODIAZEPINES Negative NEG      COCAINE Negative NEG      METHADONE Negative NEG      OPIATES Negative NEG      PCP(PHENCYCLIDINE) Negative NEG      THC (TH-CANNABINOL) Negative NEG      Drug screen comment (NOTE)    CBC W/O DIFF    Collection Time: 01/11/23 10:36 PM   Result Value Ref Range    WBC 25.1 (H) 3.6 - 11.0 K/uL    RBC 4.26 3.80 - 5.20 M/uL    HGB 13.3 11.5 - 16.0 g/dL    HCT 38.7 35.0 - 47.0 %    MCV 90.8 80.0 - 99.0 FL    MCH 31.2 26.0 - 34.0 PG    MCHC 34.4 30.0 - 36.5 g/dL    RDW 12.4 11.5 - 14.5 %    PLATELET 603 997 - 274 K/uL    MPV 9.2 8.9 - 12.9 FL    NRBC 0.0 0  WBC    ABSOLUTE NRBC 0.00 0.00 - 3.02 K/uL   METABOLIC PANEL, COMPREHENSIVE    Collection Time: 01/11/23 10:36 PM   Result Value Ref Range    Sodium 149 (H) 136 - 145 mmol/L    Potassium 2.8 (L) 3.5 - 5.1 mmol/L    Chloride 112 (H) 97 - 108 mmol/L    CO2 20 (L) 21 - 32 mmol/L    Anion gap 17 (H) 5 - 15 mmol/L    Glucose 287 (H) 65 - 100 mg/dL    BUN 21 (H) 6 - 20 MG/DL    Creatinine 1.57 (H) 0.55 - 1.02 MG/DL    BUN/Creatinine ratio 13 12 - 20      eGFR 46 (L) >60 ml/min/1.73m2    Calcium 8.4 (L) 8.5 - 10.1 MG/DL    Bilirubin, total 1.0 0.2 - 1.0 MG/DL    ALT (SGPT) 674 (H) 12 - 78 U/L    AST (SGOT) 807 (H) 15 - 37 U/L    Alk.  phosphatase 133 (H) 45 - 117 U/L    Protein, total 5.8 (L) 6.4 - 8.2 g/dL    Albumin 2.8 (L) 3.5 - 5.0 g/dL    Globulin 3.0 2.0 - 4.0 g/dL    A-G Ratio 0.9 (L) 1.1 - 2.2     MAGNESIUM    Collection Time: 01/11/23 10:36 PM   Result Value Ref Range    Magnesium 2.3 1.6 - 2.4 mg/dL   PHOSPHORUS    Collection Time: 01/11/23 10:36 PM   Result Value Ref Range    Phosphorus 8.4 (H) 2.6 - 4.7 MG/DL   LACTIC ACID    Collection Time: 01/11/23 10:36 PM   Result Value Ref Range    Lactic acid 7.3 (HH) 0.4 - 2.0 MMOL/L   HCG URINE, QL    Collection Time: 01/11/23 10:36 PM   Result Value Ref Range    HCG urine, QL Negative NEG     BLOOD GAS, ARTERIAL    Collection Time: 01/11/23 10:39 PM   Result Value Ref Range    pH 7.34 (L) 7.35 - 7.45      PCO2 33 (L) 35 - 45 mmHg    PO2 108 (H) 80 - 100 mmHg    O2 SAT 98 (H) 92 - 97 %    BICARBONATE 17 (L) 22 - 26 mmol/L    BASE DEFICIT 7.4 mmol/L    O2 METHOD VENT      Sample source ARTERIAL      SITE DRAWN FROM ARTERIAL LINE      SHELLIE'S TEST NOT APPLICABLE         Radiologic Studies -   CT HEAD WO CONT   Final Result      No acute process. CTA CHEST W OR W WO CONT   Final Result   Dense multi lobar pneumonia most conspicuous in the right upper and right lower   lobes. No evidence of pulmonary embolism. No aortic aneurysm or dissection. No additional process is identified in the chest, abdomen or pelvis. CT ABD PELV W CONT   Final Result   Dense multi lobar pneumonia most conspicuous in the right upper and right lower   lobes. No evidence of pulmonary embolism. No aortic aneurysm or dissection. No additional process is identified in the chest, abdomen or pelvis. XR CHEST PORT   Final Result      1. Limited study. 2.  Support apparatus as above. 3.  Right upper lobe pneumonia. DUPLEX LOWER EXT VENOUS BILAT    (Results Pending)   XR CHEST PORT    (Results Pending)     CT Results  (Last 48 hours)                 01/11/23 2026  CT HEAD WO CONT Final result    Impression:      No acute process. Narrative:  INDICATION: sp cardiac arrest, unknown etiology       EXAM:  HEAD CT WITHOUT CONTRAST       COMPARISON: 3/14/21       TECHNIQUE:  Routine noncontrast axial head CT was performed. Sagittal and   coronal reconstructions were generated. CT dose reduction was achieved through use of a standardized protocol tailored   for this examination and automatic exposure control for dose modulation. FINDINGS:       Ventricles: Midline, no hydrocephalus. Intracranial Hemorrhage: None. Brain Parenchyma/Brainstem: Normal for age. Basal Cisterns: Normal.   Paranasal Sinuses: Visualized sinuses are clear.    Additional Comments: N/A.           01/11/23 2026  CTA CHEST W OR W WO CONT Final result    Impression:  Dense multi lobar pneumonia most conspicuous in the right upper and right lower   lobes. No evidence of pulmonary embolism. No aortic aneurysm or dissection. No additional process is identified in the chest, abdomen or pelvis. Narrative:      CLINICAL HISTORY: Status post cardiac arrest       INDICATION:  Status post cardiac arrest   COMPARISON:  None       TECHNIQUE:    Following the uneventful intravenous administration of 100 cc Isovue-370, thin   axial images were obtained through the chest, abdomen and pelvis. Coronal and   sagittal reconstructions were generated. Oral contrast was not administered. CT dose reduction was achieved through use of a standardized protocol tailored   for this examination and automatic exposure control for dose modulation. Adaptive statistical iterative reconstruction (ASIR) was utilized. For the chest portion of the examination only;    3-D MIP reconstructed imaging was obtained. FINDINGS:    VASCULATURE: Three-vessel aortic arch. Proximal cervical vasculature is patent. No aortic aneurysm or dissection. No proximal pulmonary embolism is identified. MEDIASTINUM/HEART: There is an ET tube and NG tube in place  No hilar or   mediastinal lymphadenopathy. No esophageal mass. No endotracheal or   endobronchial mass. PLEURA/LUNGS: Dense consolidation in the right upper lobe, right lower lobe and   left lower lobe with associated groundglass opacity. There is no pleural or   pericardial effusion. SOFT TISSUE/ AXILLA: No axillary adenopathy. No chest wall mass. LIVER/GALLBLADDER: Hepatic steatosis. There is no intrahepatic duct dilatation. The CBD is not dilated. There is no hepatic parenchymal mass. Hepatic   enhancement pattern is within normal limits. The portal vein is patent. SPLEEN/PANCREAS: No mass. . There is no pancreatic mass. There is no pancreatic   duct dilatation. There is no evidence of splenomegaly. ADRENALS/KIDNEYS: No mass, calculus, or hydronephrosis. . There is no adrenal   mass. There is no hydroureter or hydronephrosis. There is no perinephric mass. No ureteral or bladder calculus. STOMACH, COLON AND SMALL BOWEL: No dilatation or wall thickening. There is no   obstruction or free intraperitoneal air. There is no evidence of incarceration   or obstruction. No mesenteric adenopathy. APPENDIX: Unremarkable. PERITONEUM/RETROPERITONEUM: There is no abdominal aortic aneurysm. No   significant lymphadenopathy. URINARY BLADDER: There is a Pagan catheter in place   PELVIS: There is no pelvic adenopathy. There is no pelvic sidewall mass. There   is no inguinal adenopathy. BONES: No destructive bone lesion. . No lytic or blastic lesions. No evidence of   fracture or dislocation. 01/11/23 2026  CT ABD PELV W CONT Final result    Impression:  Dense multi lobar pneumonia most conspicuous in the right upper and right lower   lobes. No evidence of pulmonary embolism. No aortic aneurysm or dissection. No additional process is identified in the chest, abdomen or pelvis. Narrative:      CLINICAL HISTORY: Status post cardiac arrest       INDICATION:  Status post cardiac arrest   COMPARISON:  None       TECHNIQUE:    Following the uneventful intravenous administration of 100 cc Isovue-370, thin   axial images were obtained through the chest, abdomen and pelvis. Coronal and   sagittal reconstructions were generated. Oral contrast was not administered. CT dose reduction was achieved through use of a standardized protocol tailored   for this examination and automatic exposure control for dose modulation. Adaptive statistical iterative reconstruction (ASIR) was utilized. For the chest portion of the examination only;    3-D MIP reconstructed imaging was obtained. FINDINGS:    VASCULATURE: Three-vessel aortic arch. Proximal cervical vasculature is patent.    No aortic aneurysm or dissection. No proximal pulmonary embolism is identified. MEDIASTINUM/HEART: There is an ET tube and NG tube in place  No hilar or   mediastinal lymphadenopathy. No esophageal mass. No endotracheal or   endobronchial mass. PLEURA/LUNGS: Dense consolidation in the right upper lobe, right lower lobe and   left lower lobe with associated groundglass opacity. There is no pleural or   pericardial effusion. SOFT TISSUE/ AXILLA: No axillary adenopathy. No chest wall mass. LIVER/GALLBLADDER: Hepatic steatosis. There is no intrahepatic duct dilatation. The CBD is not dilated. There is no hepatic parenchymal mass. Hepatic   enhancement pattern is within normal limits. The portal vein is patent. SPLEEN/PANCREAS: No mass. . There is no pancreatic mass. There is no pancreatic   duct dilatation. There is no evidence of splenomegaly. ADRENALS/KIDNEYS: No mass, calculus, or hydronephrosis. . There is no adrenal   mass. There is no hydroureter or hydronephrosis. There is no perinephric mass. No ureteral or bladder calculus. STOMACH, COLON AND SMALL BOWEL: No dilatation or wall thickening. There is no   obstruction or free intraperitoneal air. There is no evidence of incarceration   or obstruction. No mesenteric adenopathy. APPENDIX: Unremarkable. PERITONEUM/RETROPERITONEUM: There is no abdominal aortic aneurysm. No   significant lymphadenopathy. URINARY BLADDER: There is a Pagan catheter in place   PELVIS: There is no pelvic adenopathy. There is no pelvic sidewall mass. There   is no inguinal adenopathy. BONES: No destructive bone lesion. . No lytic or blastic lesions. No evidence of   fracture or dislocation. CXR Results  (Last 48 hours)                 01/11/23 1949  XR CHEST PORT Final result    Impression:      1. Limited study. 2.  Support apparatus as above. 3.  Right upper lobe pneumonia.            Narrative:  EXAM:  XR CHEST PORT       INDICATION: placement       COMPARISON: none       TECHNIQUE: Supine portable chest AP view       FINDINGS:        ET tube terminates approximately 4.8 cm above the chuyita. Enteric tube extends   inferiorly off the field-of-view. Cardiomediastinal silhouette is within normal limits. Hazy consolidation   throughout the right upper lobe concerning for pneumonia. Costophrenic sulci are   excluded, limiting evaluation for pleural effusion. No definite pneumothorax. Medical Decision Making   I am the first provider for this patient. I reviewed the vital signs, available nursing notes, past medical history, past surgical history, family history and social history. Vital Signs-Reviewed the patient's vital signs. Patient Vitals for the past 12 hrs:   Temp Pulse Resp BP SpO2   01/12/23 0000 -- (!) 113 27 (!) 151/87 94 %   01/11/23 2340 -- -- -- -- 100 %   01/11/23 2335 -- (!) 114 30 -- 100 %   01/11/23 2300 -- (!) 117 30 (!) 113/44 --   01/11/23 2225 97.4 °F (36.3 °C) (!) 118 (!) 33 108/70 90 %   01/11/23 2222 96.8 °F (36 °C) (!) 118 20 -- --   01/11/23 1940 -- (!) 139 (!) 33 -- 90 %   01/11/23 1936 -- (!) 147 -- 122/89 (!) 84 %       Records Reviewed: Nursing records and medical records reviewed    Medical Decision Making  Presented to the emergency department in cardiac arrest.  Is unclear what the cause. From the monitor there is difficult to tell her presenting rhythm, but when able to be hooked up she appeared to be in pulseless electrical activity. CPR was performed and ROSC was obtained initially after 2 rounds of compressions and 1 mg of epinephrine that was given through an IO. She then had another episode of PEA that resolved after 1 round of compressions. This happened again once more. She was intubated during her resuscitation. IV access was obtained in both arms, and IO in her left leg, and a central line in her right leg. She was immediately given IV vasopressors with norepinephrine and epinephrine drip.   After her third cardiac arrest I decided with ICU that due to her significant tachycardia and apparent hypoxia that benefits outweighed risks and TNK was given. She was given 50 mg TNKase for possible PE since she is a young patient with no other apparent risks of bleeding. IV antibiotics were immediately ordered with blood cultures. She had a significant lactic acidosis with a pH of 6.7. I suspect that this is due to her shock which I am unsure of the cause. She was taken to the operating room for bronchoscopy and echocardiogram after CT imaging. She was admitted to the ICU when these diagnostic tests were performed. Ultimately unsure of cause of her cardiac arrest - PE possible. Cardiac event also possible with syncope earlier today. Doubt trauma. Suspect infection is a possible cause - does have dense consolidation of upper lobes but this could be a result of aspiration or bleeding during her arrest.  A code shock was ordered and ICU planned to discuss further with CT surgery about possible ECMO. Cardiology was consulted for a stat echocardiogram.  A stat EEG was ordered. Amount and/or Complexity of Data Reviewed  Independent Historian: parent and EMS  External Data Reviewed: notes. Details: reviewed previous hospital notes  Labs: ordered. Decision-making details documented in ED Course. Radiology: ordered. Decision-making details documented in ED Course. ECG/medicine tests: ordered. Decision-making details documented in ED Course. Discussion of management or test interpretation with external provider(s): Discussion with ICU, cardiology regarding possible ECMO, management of shock    Risk  Prescription drug management. Decision regarding hospitalization. ED Course:   Initial assessment performed. The patients presenting problems have been discussed, and they are in agreement with the care plan formulated and outlined with them.   I have encouraged them to ask questions as they arise throughout their visit.          Medications Administered       calcium chloride injection       Admin Date  01/11/2023 Action  Given Dose  1 g Route  IntraVENous Administered By  Екатерина Barron RN              EPINEPHrine (ADRENALIN) 0.1 mg/mL syringe       Admin Date  01/11/2023 Action  Given Dose  1 mg Route  IntraVENous Administered By  Vita Harris RN               Admin Date  01/11/2023 Action  Given Dose  1 mg Route  IntraVENous Administered By  Vita Harris RN              EPINEPHrine (ADRENALIN) 5,000 mcg in 0.9% sodium chloride 250 mL infusion       Admin Date  01/11/2023 Action  New Bag Dose  10 mcg/min Rate  30 mL/hr Route  IntraVENous Administered By  Vita Harris RN               Admin Date  01/11/2023 Action  Rate Change Dose  5 mcg/min Rate  15 mL/hr Route  IntraVENous Administered By  No Esteban RN               Admin Date  01/11/2023 Action  Rate Change Dose  10 mcg/min Rate  30 mL/hr Route  IntraVENous Administered By  No Esteban RN              fentaNYL citrate (PF) injection 50 mcg       Admin Date  01/11/2023 Action  Given Dose  50 mcg Route  IntraVENous Administered By  Екатерина Barron RN      Admin Date  01/11/2023 Action  Given Dose  50 mcg Route  IntraVENous Administered By  No Esteban RN              iopamidoL (ISOVUE-370) 370 mg iodine /mL (76 %) injection 100 mL       Admin Date  01/11/2023 Action  Given Dose  100 mL Route  IntraVENous Administered By  Luis Wright              NOREPINephrine (LEVOPHED) 8 mg in 5% dextrose 250mL (32 mcg/mL) infusion       Admin Date  01/11/2023 Action  New Bag Dose  20 mcg/min Rate  37.5 mL/hr Route  IntraVENous Administered By  Vita Harris RN               Admin Date  01/11/2023 Action  Rate Change Dose  50 mcg/min Rate  93.8 mL/hr Route  IntraVENous Administered By  No Esteban RN               Admin Date  01/11/2023 Action  Rate Change Dose  50 mcg/min Rate  93.8 mL/hr Route  IntraVENous Administered By  No Esteban RN Admin Date  01/11/2023 Action  Rate Change Dose  75 mcg/min Rate  140.6 mL/hr Route  IntraVENous Administered By  Osiel Mehta RN              sodium bicarbonate 8.4 % (1 mEq/mL) injection       Admin Date  01/11/2023 Action  Given Dose  50 mEq Route  IntraVENous Administered By  Lorenza Hampton RN               Admin Date  01/11/2023 Action  Given Dose  50 mEq Route  IntraVENous Administered By  Lorenza Hampton RN               Admin Date  01/11/2023 Action  Given Dose  50 mEq Route  IntraVENous Administered By  Lorenza Hampton RN               Admin Date  01/11/2023 Action  Given Dose  50 mEq Route  IntraVENous Administered By  Lorenza Hampton RN              sodium chloride 0.9 % bolus infusion 1,000 mL       Admin Date  01/11/2023 Action  New Bag Dose  1,000 mL Route  IntraVENous Administered By  Osiel Mehta RN              tenecteplase [facility]) injection       Admin Date  01/11/2023 Action  Given Dose  50 mg Route  IntraVENous Administered By  Lorenza Hampton RN                  Procedure Note - CPR Efforts:   7:00PM  I supervised and directed all CPR efforts. Procedure Time: 15 minutes    Procedure Note - EZ-IO Catheter insertion:  7:05 PM  Performed by: Dr. Khan Head  Location: lefttibia on the left  Was unsuccessful   The procedure took 1-15 minutes, and pt tolerated well. Procedure Note - Orotracheal Intubation:   7:10 PM  Performed by: Jia Marmolejo MD, supervised by Isauro Lim MD   Indication for procedure: respiratory failure  RSI performed. No meds needed for sedaion and paralytics a 7.5 cuffed [place] endotracheal tube using a Mac4 blade with direct visualization. Due to emergent scenario unable to determine how deep tube placed immediately. ETT location confirmed by bilateral, symmetric breath sounds and good end-tidal CO2 detector color change . Number of attempts: 1  Complications: none  The procedure took 1-15 minutes, and pt tolerated well.     Procedure Note - [facility] Placement:   7:15 PM  Performed by: Lia Bran, Supervised by Anum Dougherty MD    Immediately prior to the procedure, the patient was reevaluated and found suitable for the planned procedure and any planned medications. Immediately prior to the procedure a time out was called to verify the correct patient, procedure, equipment, staff, and marking as appropriate. Area was not cleansed or anesthesized, was placed emergently. Landmarks identified. triple lumen catheter was inserted into pt's Right Groin with ultrasound guidance. Line sutured in place; Position: Trendelenburg  Number of attempts: 1  Estimated blood loss: none  Ultrasound guidance was used for real-time guidance in which the vessel was patent. The procedure took 1-15 minutes, and pt tolerated well. Critical Care:  I have spent 80 minutes of critical care time in evaluating and treating this patient. This includes time spent at bedside, time with family and decision makers, documentation, review of labs and imaging, and/or consultation with specialists. It does not include time spent on separately billed procedures. This patient presents with a critical illness or injury that acutely impairs one or more vital organ systems such that there is a high probability of imminent or life threatening deterioration in the patient's condition. This case involved decision making of high complexity to assess, manipulate, and support vital organ system failure and/or to prevent further life threatening deterioration of the patient's condition. Failure to initiate these interventions on an urgent basis would likely result in sudden, clinically significant or life threatening deterioration in the patient's condition.     Abnormal findings supporting critical care: ventilation management, medication management, management following cardiac arrest, shock  Interventions to support critical care: IV vasopressors, manual ventilation, ventilator management, IVF, IV abx, IV thrombolytics  Failure to intervene may result in: death    Disposition:  ICU    Diagnosis     Clinical Impression:   1. Cardiac arrest [facility])      Attestations:    Erwin Gabriel MD    Please note that this dictation was completed with [employer], the computer voice recognition software. Quite often unanticipated grammatical, syntax, homophones, and other interpretive errors are inadvertently transcribed by the computer software. Please disregard these errors. Please excuse any errors that have escaped final proofreading. Thank you.

## 2023-01-12 NOTE — PROGRESS NOTES
7751 - Report received from [facility], [address] completed. Patient sedated on propofol at 40 (See MAR for continued titrations) and unresponsive with involuntary muscle jerking of arms and face. On ventilator FiO2 80% and PEEP of 10. Sinus tachycardia on vasopressin at 0.04, Epi at 6, Dobutamine at 2.5 (See MAR for continued titrations). Right IJ MAC/SWAN patent and lines capped, Right femoral CVC patently infusing. Right arm PIV patent and intact. Skin is clean, dry, and intact. 1030 - Patient family at bedside. Notified Dr. Viraj Briscoe ([facility])    80 - Dr. Viraj Briscoe at bedside to talk with family. 1200 - Reassessment completed. No changes noted. 1600 - Reassessment completed. No changes noted. 1745 - Dobutamine stopped per Dr. Cristopher Kamara due to tachycardia 140s. Cardiac index 4.0 and /70.

## 2023-01-12 NOTE — ANESTHESIA PROCEDURE NOTES
Arterial Line Placement    Start time: 1/11/2023 8:49 PM  End time: 1/11/2023 8:52 PM  Performed by: Pina Russ MD  Authorized by:  Pina Russ MD     Pre-Procedure  Indications:  Arterial pressure monitoring and blood sampling  Preanesthetic Checklist: patient identified, risks and benefits discussed, anesthesia consent, site marked, patient being monitored, timeout performed and patient being monitored      Procedure:   Prep:  Chlorhexidine  Seldinger Technique?: Yes    Orientation:  Right  Location:  Radial artery  Catheter size:  20 G  Number of attempts:  1  Cont Cardiac Output Sensor: No      Assessment:   Post-procedure:  Line secured and sterile dressing applied  Patient Tolerance:  Patient tolerated the procedure well with no immediate complications

## 2023-01-12 NOTE — PROGRESS NOTES
ICU DAILY PROGRESS NOTE      Summary  28year old female with history of anxiety/depression admitted (1/11) s/p unclear etiology of PEA arrest with ROSC. History per chart review and EMS: syncopal episode earlier in the day, (no LOC/trauma), reported to feel like Hiren Rojo was going to die\" currently on menstrual cycle, (previously noted to be on birth control, unclear if still taking) with generalized weakness and decreased appetite. Pthad cardiac arrest x2 with ROSC. She was clinically treated for PE receiving thrombolytics in ED with oxygenation and hemodynamic improvement about an hour after thrombolysis. However, no evidence of PE found on CT chest.    During her resuscitaiton a swan was planced and she had low CI that improved with inotropes and pressors. Etiology of cardiogenic shock unclear. Family Meeting:  Provided pt's mother and brother an update at bedside. Informed that that pt has stabilized but is in critical condition with poor prognosis being most likely. We discussed that we are providing clinical support but that the current Neuro exam and EEG suggesting anoxic brain injury. Previous 24 Hours    S/p cardiac arrest with unclear etiology. [facility] Day 1    Critical Care Problems    Patient Active Problem List   Diagnosis Code    Cardiac arrest ([facility] [address] I46.9    Heart disease I51.9         Plan    Neuro    Myoclonus  Possible anoxic brain injury  - pt post cardiac arrest with myoclonus that worsens with decrease in sedation. Discussed with neurology at bedside. No evidence of seizures. Myoclonus appears to be consistent with anoxic brain injury. EEG also consistent with anoxic brain injury. - continue normo-thermia  - glycemic control  - repeat imaging study in the morning.     CVS    Cardiac arrest- PEA  Cardiogenic shock  - etiology unclear  - SVO2- 74 but CI only 1.9 improved from post arrest; still may have myocardial stunning.  - continue inotropes and pressors to maintian MAP > 65    Pulm    Acute respiratory failure    - initially PE suspected which based on history and clinical picture appeared likely. CT chest showed no evidence of PE. Possible underlying cardiomyopathy or myocarditis. - continue vent support. - will assess daily for appropriateness of SBT since pt probably suffered significant anoxic brain injury. GI  - GI prophylaxis with protonix  - insert NG/OG tube    Renal    RHODA  Hypernatremia  Hypokalemia  Metabolic acidosis  - trend BUN/Cr  - trend UOP  - replete lytes as needed  - increase free water to correct hypernatremia  - trend BMP and lactate      Endo    Hyperglycemia  BHCG negative    - started on insulin drip for tighter glycemic control  - has menstrual cycle; unclear if still on birth control  - HgBA1C pending    ID    RUL consolidation/hemorrhage  Leukocytosis  - continue abx    Heme  - s/p thrombolytics  - trend H/H    Vitals  Visit Vitals  BP (!) 136/97   Pulse (!) 120   Temp 98.9 °F (37.2 °C)   Resp 26   Ht 5' 7\" (1.702 m)   Wt 83.9 kg (185 lb)   SpO2 99%   BMI 28.98 kg/m²      O2 Device: Ventilator Temp (24hrs), Av.6 °F (37 °C), Min:96.8 °F (36 °C), Max:100 °F (37.8 °C)    CVP (mmHg): 12 mmHg (23 0800)      Intake/Output:     Intake/Output Summary (Last 24 hours) at 2023 0901  Last data filed at 2023 0645  Gross per 24 hour   Intake 1942.27 ml   Output 1135 ml   Net 807.27 ml         Physical exam:  Physical Exam  HENT:      Head: Normocephalic. Eyes:      Pupils: Pupils are equal, round, and reactive to light. Cardiovascular:      Rate and Rhythm: Regular rhythm. Tachycardia present. Heart sounds: Normal heart sounds. Pulmonary:      Effort: Pulmonary effort is normal.      Breath sounds: Normal breath sounds. Abdominal:      Palpations: Abdomen is soft. Skin:     General: Skin is warm and dry. Neurological:      Comments: Sedated; myoclonic jerking.           I have examined the patient on this day 1/12/2023 and the above documented exam is accurate including the components that have been copied forward    LABS AND  DATA: Personally reviewed  Recent Labs     01/12/23 0445 01/11/23 2236   WBC 30.2* 25.1*   HGB 13.1 13.3   HCT 37.4 38.7    202     Recent Labs     01/12/23 0445 01/11/23 2236   * 149*   K 2.9* 2.8*   * 112*   CO2 17* 20*   BUN 22* 21*   CREA 1.93* 1.57*   * 287*   CA 8.1* 8.4*   MG 2.2 2.3   PHOS 3.6 8.4*     Recent Labs     01/12/23 0445 01/11/23 2236 01/11/23  1925    133* 92   TP 5.9* 5.8* 6.7   ALB 2.8* 2.8* 3.2*   GLOB 3.1 3.0 3.5   LPSE  --   --  133     No results for input(s): INR, PTP, APTT, INREXT in the last 72 hours. Recent Labs     01/11/23  1950   PHI 6.92*   PCO2I 77.4*   PO2I 96   FIO2I 100     No results for input(s): CPK, CKMB, TROIQ, BNPP in the last 72 hours. Hemodynamics:   PAP: PAP Systolic: 28 (62/40/02 8984) CO: CO (l/min): 4.4 l/min (01/12/23 0800)   Wedge:   CI: CI (l/min/m2): 2.3 l/min/m2 (01/12/23 0800)   CVP:  CVP (mmHg): 12 mmHg (01/12/23 0800) SVR:       PVR:       Ventilator Settings:  Mode Rate Tidal Volume Pressure FiO2 PEEP   Assist control   350 ml    80 % 10 cm H20     Peak airway pressure: 24 cm H2O    Minute ventilation: 9.82 l/min        MEDS: Reviewed    Chest X-Ray:  CXR Results  (Last 48 hours)                 01/12/23 0506  XR CHEST PORT Final result    Impression:  Improved consolidative opacity right upper lung with slightly   worsened appearance to diffuse patchy opacities bilaterally in a pattern   suggestive of pneumonic infiltrate with superimposed alveolar edema. Narrative:  EXAM:  XR CHEST PORT       INDICATION: Intubated. COMPARISON: CT 1/11/2023 and chest x-ray 1/11/2023. TECHNIQUE: Semierect portable chest AP view       FINDINGS: Endotracheal tubes project in stable and expected position with the   tip at approximately 3 cm above the chuyita.  There is been placement of right central venous introducer catheter with indwelling [place] catheter which   traverses expected course of terminate in the region of main pulmonary artery. There is been interval improvement in consolidative opacity in the right upper   lung, however there is remain and appear slightly worsened airspace opacities   diffusely throughout both lungs with relative sparing of the left apex. There is   no pleural effusion or pneumothorax. The cardiac silhouette is within normal   limits. The pulmonary vasculature is within normal limits. 01/11/23 1949  XR CHEST PORT Final result    Impression:      1. Limited study. 2.  Support apparatus as above. 3.  Right upper lobe pneumonia. Narrative:  EXAM:  XR CHEST PORT       INDICATION: placement       COMPARISON: none       TECHNIQUE: Supine portable chest AP view       FINDINGS:        ET tube terminates approximately 4.8 cm above the chuyita. Enteric tube extends   inferiorly off the field-of-view. Cardiomediastinal silhouette is within normal limits. Hazy consolidation   throughout the right upper lobe concerning for pneumonia. Costophrenic sulci are   excluded, limiting evaluation for pleural effusion. No definite pneumothorax. Multidisciplinary Rounds Completed:  Yes      DISPOSITION  Stay in ICU    CRITICAL CARE CONSULTANT NOTE  I had a in-person encounter with Emilie Hood, reviewed and interpreted patient data including events, labs, images, vital signs, I/O's, and examined patient. I have discussed the case and the plan and management of the patient's care with the consulting services, the bedside nurses and the respiratory therapist.      NOTE OF PERSONAL INVOLVEMENT IN CARE   This patient is at high risk for sudden and clinically significant deterioration, which requires the highest level of preparedness to intervene urgently.  I participated in the decision-making and personally managed or directed the management of the following life and organ supporting interventions that required my frequent assessment to treat or prevent imminent deterioration. I personally spent 70 minutes of critical care time. This is time spent at patient's bedside actively involved in patient care as well as the coordination of care and discussions with the patient's family. This does not include any procedural time which has been billed separately.       ------------------------------------------------------------------------------    Heriberto Willingham MD, PhD  [address]  [phone]

## 2023-01-12 NOTE — PROCEDURES
EEG REPORT    Patient Name: Annamaria Kasper  : 1995  Age: 28 y.o. Ordering physician: Dr. Jone Blake  Date of EE2023  8:16 - 8:41  Diagnosis: encephalopathy  Interpreting physician: Li Unger. NICOLE Clark FAAN    Procedure: EEG    CLINICAL INDICATION: The patient is a 28 y.o. female who is being evaluated for baseline electro cerebral activities and to rule out seizure focus.       Current Facility-Administered Medications   Medication Dose Route Frequency    DOBUTamine (DOBUTREX) 500 mg/250 mL (2,000 mcg/mL) infusion  0-5 mcg/kg/min IntraVENous TITRATE    acetaminophen (TYLENOL) tablet 650 mg  650 mg Oral Q4H PRN    Or    acetaminophen (TYLENOL) suppository 650 mg  650 mg Rectal Q4H PRN    insulin regular (NOVOLIN R, HUMULIN R) 100 Units in 0.9% sodium chloride 100 mL infusion  0-50 Units/hr IntraVENous TITRATE    [START ON 2023] Vanc trough  1 Each Other ONCE    fentaNYL (PF) 1,500 mcg/30 mL (50 mcg/mL) infusion  0-200 mcg/hr IntraVENous TITRATE    midazolam (VERSED) injection 2 mg  2 mg IntraVENous ONCE    glucose chewable tablet 16 g  4 Tablet Oral PRN    glucagon (GLUCAGEN) injection 1 mg  1 mg IntraMUSCular PRN    dextrose 10% infusion 0-250 mL  0-250 mL IntraVENous PRN    pantoprazole (PROTONIX) 40 mg in 0.9% sodium chloride 10 mL injection  40 mg IntraVENous Q12H    chlorhexidine (ORAL CARE KIT) 0.12 % mouthwash 15 mL  15 mL Oral Q12H    piperacillin-tazobactam (ZOSYN) 3.375 g in 0.9% sodium chloride (MBP/ADV) 100 mL MBP  3.375 g IntraVENous Q8H    vancomycin (VANCOCIN) 750 mg in 0.9% sodium chloride 250 mL ([id number])  750 mg IntraVENous Q12H    vasopressin (VASOSTRICT) 20 Units in 0.9% sodium chloride 100 mL infusion  0.04 Units/min IntraVENous CONTINUOUS    EPINEPHrine (ADRENALIN) 5 mg in 0.9% sodium chloride 250 mL infusion  0-10 mcg/min IntraVENous TITRATE    propofol (DIPRIVAN) 10 mg/mL infusion  0-50 mcg/kg/min IntraVENous TITRATE           DESCRIPTION OF PROCEDURE:     This is a digitally recorded electroencephalogram  Electrodes were applied in accordance with the international 10-20 system of electrode placement. 18 channels of scalp EEG are recorded  A channel was used for EoG  Another channel was used for ECG   The data is stored digitally and reviewed in reformatted montages for optimal display  EEG  was reviewed in both bipolar and referential montages    Description of Activity: The background of this recording contains no well-formed alpha activity seen. There was a suppressed background consisting of a low amplitud diffuse delta activity identified throughout the study. Throughout the recording, there were no clear areas of focal slowing nor spike or spike-and-wave discharges seen. Hyperventilation was not performed. Photic stimulation produced no response in the posterior head regions. The patient did have multiple bouts of irregular, involuntary myoclonic muscle jerking of face and arms. These movements revealed no epileptiform activities and were not seizures. During the recording, the patient did  not achieve stage II sleep      Clinical Interpretation: This EEG is markedly abnormal. There is severe generalized low amplitude slowing. There was minimal reactivity. The abnormal myoclonic movements were not based in epilepsy. There were no seizures seen on the eeg. Clinical correlation is recommended      Bin Clark D.O.   [facility]

## 2023-01-12 NOTE — PROGRESS NOTES
2133 TRANSFER - IN REPORT:    Verbal report received from Paulie Tamez RN(name) on Ramsay  being received from ED(unit) for routine progression of care  Report consisted of patients Situation, Background, Assessment and   Recommendations(SBAR). Information from the following report(s) SBAR, ED Summary, MAR, and Recent Results was reviewed with the receiving nurse. 2137 TRANSFER - IN REPORT:    Verbal report received from [address] [place] [address] Avenue, RN(name) on Ramsay  being received from OR(unit) for routine progression of care  Report consisted of patients Situation, Background, Assessment and Recommendations(SBAR). Information from the following report(s) SBAR, Procedure Summary, Intake/Output, MAR, Recent Results, and Cardiac Rhythm Sinus tachy  was reviewed with the receiving nurse. 2210 Pt arrived to CCU room 2524, bedside update given by Kassy Olmstead CRNA using SBAR format, Pt care assumed at this time. 2300 Assessment completed, refer to the complex assessment flow sheet. 2342 stopped propofol per NP order    0007 Rapid EEG started. 0020 restarted propofol due to increased myoclonus activity    0330 Reassessment completed, no significant changes from the previous assessment. Rapid EEG completed and removed.     0418 vasopressin held per NP order    0435 Paused Dabutamine drip per NP request.    Makayla Mcmahon restarted vasopressin per NP order    0546 restarted dabutamine drip per NP request.     0710 Bedside shift report given to Riya Lockhart

## 2023-01-12 NOTE — PROCEDURES
EEG REPORT    Patient Name: Jose Hoyos  : 1995  Age: 28 y.o. Ordering physician: Dr. Markos Acuña  Date of EE  00:07 - 04:09  Diagnosis: encephalopathy  Interpreting physician: Cesar Clark D.O. FAAN    Procedure: EEG    CLINICAL INDICATION: The patient is a 28 y.o. female who is being evaluated for baseline electro cerebral activities and to rule out seizure focus. Current Facility-Administered Medications   Medication Dose Route Frequency    DOBUTamine (DOBUTREX) 500 mg/250 mL (2,000 mcg/mL) infusion  0-5 mcg/kg/min IntraVENous TITRATE    acetaminophen (TYLENOL) tablet 650 mg  650 mg Oral Q4H PRN    Or    acetaminophen (TYLENOL) suppository 650 mg  650 mg Rectal Q4H PRN    potassium chloride 20 mEq in 50 ml IVPB  20 mEq IntraVENous Q1H    sodium bicarbonate (8.4%) 1 mEq/mL (8.4 %) injection        glucose chewable tablet 16 g  4 Tablet Oral PRN    glucagon (GLUCAGEN) injection 1 mg  1 mg IntraMUSCular PRN    dextrose 10% infusion 0-250 mL  0-250 mL IntraVENous PRN    pantoprazole (PROTONIX) 40 mg in 0.9% sodium chloride 10 mL injection  40 mg IntraVENous Q12H    chlorhexidine (ORAL CARE KIT) 0.12 % mouthwash 15 mL  15 mL Oral Q12H    piperacillin-tazobactam (ZOSYN) 3.375 g in 0.9% sodium chloride (MBP/ADV) 100 mL MBP  3.375 g IntraVENous Q8H    vancomycin (VANCOCIN) 750 mg in 0.9% sodium chloride 250 mL ([id number])  750 mg IntraVENous Q12H    vasopressin (VASOSTRICT) 20 Units in 0.9% sodium chloride 100 mL infusion  0.04 Units/min IntraVENous CONTINUOUS    EPINEPHrine (ADRENALIN) 5 mg in 0.9% sodium chloride 250 mL infusion  0-10 mcg/min IntraVENous TITRATE    propofol (DIPRIVAN) 10 mg/mL infusion  0-50 mcg/kg/min IntraVENous TITRATE    insulin lispro (HUMALOG) injection   SubCUTAneous Q4H           DESCRIPTION OF PROCEDURE:     This is a digitally recorded electroencephalogram    Description of procedure:    This EEG was obtained using a 10 lead, 8 channel system positioned circumferentially without any parasagittal coverage (rapid EEG). Computer selected EEG is reviewed as well as background features and all clinically significant events. Clarity algorithm utilized and implemented to provide analysis of underlying activity and seizure detection used to facilitate reading. Description of recording: The background of this recording contains no well-formed alpha activity seen. There was a mixture of disorganized low amplitude diffuse theta and delta activity identified throughout the study. Throughout the recording, there were no clear areas of focal slowing nor definitive spike or spike-and-wave discharges seen. Automated seizure detection burden was reviewed. Hyperventilation and photic stimulation were not performed. During the recording, the patient did  not achieve stage II sleep      Clinical Interpretation: This Rapid EEG is abnormal. There is disorganized generalized slowing as seen in encephalopathies. There is no definitive evidence of focal asymmetry, seizures or epileptiform discharges seen. Clinical correlation is recommended      Comment:   If there is still persistent suspicion for continued seizure-like  activity, I would recommend obtaining an electroencephalogram with the 10-20 international system for improved spatial resolution and parasagittal coverage. Bin Clark D.O.   Genna Hickey

## 2023-01-12 NOTE — PROGRESS NOTES
Spiritual Care Assessment/Progress Note  [facility]      NAME: Jane Grant      MRN: [id number]  AGE: 28 y.o. SEX: female  Adventism Affiliation: Latter day   Language: English     1/11/2023     Total Time (in minutes): 39     Spiritual Assessment begun in [facility] EMERGENCY DEPT through conversation with:         []Patient        [x] Family    [] Friend(s)        Reason for Consult: Crisis     Spiritual beliefs: (Please include comment if needed)     [] Identifies with a wilian tradition:         [] Supported by a wilian community:            [] Claims no spiritual orientation:           [] Seeking spiritual identity:                [] Adheres to an individual form of spirituality:           [x] Not able to assess:                           Identified resources for coping:      [] Prayer                               [] Music                  [] Guided Imagery     [] Family/friends                 [] Pet visits     [] Devotional reading                         [x] Unknown     [] Other:                                                Interventions offered during this visit: (See comments for more details)          Family/Friend(s):  Affirmation of emotions/emotional suffering, Affirmation of wilian, Catharsis/review of pertinent events in supportive environment, Coping skills reviewed/reinforced, Iconic (affirming the presence of God/Higher Power), Life review/legacy, Normalization of emotional/spiritual concerns, Prayer (actual), Adventism beliefs/image of God discussed     Plan of Care:     [x] Support spiritual and/or cultural needs    [] Support AMD and/or advance care planning process      [] Support grieving process   [] Coordinate Rites and/or Rituals    [] Coordination with community clergy   [] No spiritual needs identified at this time   [] Detailed Plan of Care below (See Comments)  [] Make referral to Music Therapy  [] Make referral to Pet Therapy     [] Make referral to Addiction services  [] Make referral to [facility]  [] Make referral to Spiritual Care Partner  [] No future visits requested        [x] Contact Spiritual Care for further referrals     Comments:   responded to manfred to the ER for a cardiac arrest patient.  escorted family; parents and a brother, to the ER consult room and provided supportive presence as physician updated them of patient's current situation. Family were distraught about the information they received and became emotional and tearful. They indicated that wilian was very important for coping. His brother, led in prayer after a short moment of silence. They shared that patient's medical challenges began about two years ago after she lost a  family member and some closes friends, all in a short span of time.  provided them space to process current situation, advising of continuous spiritual care availability. They expressed appreciation for the support.  circled back to the ER and after consulting with nurse led family to the CCU waiting room. CCU nurse was informed about family's presence in the waiting room. Please contact spiritual care for further referrals.        Visited by: Merline Fenton  To manfred carson: [phone]

## 2023-01-12 NOTE — ANESTHESIA POSTPROCEDURE EVALUATION
Post-Anesthesia Evaluation and Assessment    Patient: Danny Vazquez MRN: [id number]  SSN: [id number]    YOB: 1995  Age: 28 y.o. Sex: female         Cardiovascular Function/Vital Signs  Visit Vitals  BP (!) 125/109   Pulse (!) 121   Temp 37.4 °C (99.4 °F)   Resp 26   Ht 5' 7\" (1.702 m)   Wt 83.9 kg (185 lb)   SpO2 98%   BMI 28.98 kg/m²       Patient is status post General anesthesia for Procedure(s):  Bronchoscopy, Central line placement, ANDREW, Arterial line placement. Nausea/Vomiting: None    Postoperative hydration reviewed and adequate. Pain:  Pain Scale 1: Behavioral Pain Scale (BPS) (01/11/23 2300)   Managed    Neurological Status:       sedated    Mental Status, Level of Consciousness: Sedated    Pulmonary Status:   O2 Device: Ventilator (01/11/23 2340)   Adequate oxygenation and airway patent    Complications related to anesthesia: None    Post-anesthesia assessment completed. Critically ill s/p code. Guarded prognosis    Signed By: Audra Hayes MD     January 12, 2023              Procedure(s):  Bronchoscopy, Central line placement, ANDREW, Arterial line placement. general    <[email or web address]>    INITIAL Post-op Vital signs:   Vitals Value Taken Time   /109 01/12/23 0700   Temp 37.4 °C (99.4 °F) 01/12/23 0600   Pulse 123 01/12/23 0705   Resp 26 01/12/23 0705   SpO2 84 % 01/12/23 0705   Vitals shown include unvalidated device data.

## 2023-01-12 NOTE — CONSULTS
Full H&P to follow, please see note by NP Nichole     Called to bedside by ER staff for evaluation of 28year old female with no significant medical history s/o cardiac arrest x2. Upon arrival, pt was on NE and Epi. O2 sat 50-70%. BP was not registering on monitor but she had femoral / carotid pulse. She had a witnessed syncopal episode at home before becoming pulseless in EMS. After discussion with ER physician, lytic therapy was given. ABG post lytics on max vent settings 6.9 / 77 / 96. Attempted bedside echo but was unable to get adequate views. Bicarb was given. Pressors were titrated until MAP >65. Patient began to desaturate again. Code shock was called. Discussed case with CTS, possible VV ecmo. When patient was stable enough , she was transported to [facility], approx 1 hour post lytics. No PE noted. Bronch and ANDREW preformed in OR. Patient overall oxygenation somewhat improved at this point. ABG in OR with po>90. Discussion with cardiology / cts / anesthesia - decided against ECMO at this time. Transfer to ICU for ongoing care     Minerva Spain MD [address] [place] [address],# [phone]    I personally spent 100 minutes of critical care time. This is time spent actively involved in patient care as well as the coordination of care and/or discussions with the patient's family. This does not include any procedural time which has been billed separately.

## 2023-01-12 NOTE — PERIOP NOTES
TRANSFER - OUT REPORT:    Verbal report given to Doug Quiñones RN  on [place]  being transferred to CCU for routine post - op       Report consisted of patients Situation, Background, Assessment and   Recommendations(SBAR). Information from the following report(s) SBAR, OR Summary, and Procedure Summary was reviewed with the receiving nurse. Lines:   Triple Lumen 01/11/23 Right Femoral (Active)       Peripheral IV 01/11/23 Anterior;Proximal;Right Forearm (Active)       Arterial Line 01/11/23 Right Radial artery (Active)       Intraosseous Line 01/11/23 Left;Tibia (Active)        Opportunity for questions and clarification was provided.       Patient transported with:   Monitor  O2 @ 10 liters  Registered Nurse  CRNA

## 2023-01-12 NOTE — PROCEDURES
[facility]  Bronchoscopy Procedure Note    Procedure:  Diagnostic bronchoscopy. Indication:  Abnormal chest imaging, hemoptysis, hypoxia     Consent/Treatment:  Informed consent was obtained from the   emergent  after risks, benefits and alternatives were explained. Timeout verified the correct patient and correct procedure. Anesthesia:   Patient on ventilator and receiving  see anesthesia MAR    The following parameters were monitored: oxygen saturation, heart rate, blood pressure, respiratory rate, EKG, adequacy of pulmonary ventilation and response to care. Total physician intraservice time was 35    Procedure Details:   -- The bronchoscope was introduced through an endotracheal tube. -- The trachea and chuyita were completely inspected and were found to be normal.  -- The right-sided endobronchial anatomy was completely inspected and was found to be normal.  -- The left-sided endobronchial anatomy was completely inspected and was found to be normal.     Specimens: The bronchoscope was wedged in the RUL and bronchoalveolar lavage was performed; material was sent for  microbiology, cytology, and AFB smear and culture. The fluid was grossly bloody. There was 50 cc of sterile saline sequentially instilled in 10cc aliquants without clearing for the blood. BAL was preformed in the MAIKOL with return of clear fluid with no blood.      Complications: none    Estimated Blood Loss: less than 28 [facility],  [address],  [facility] [phone]

## 2023-01-12 NOTE — ANESTHESIA PROCEDURE NOTES
Central Line and Pulmonary Artery Catheter Placement    Start time: 1/11/2023 9:15 PM  End time: 1/11/2023 9:28 PM  Performed by: Ariana Gutierrez MD  Authorized by: Ariana Gutierrez MD     Indications: vascular access, central pressure monitoring and need for vasopressors  Preanesthetic Checklist: patient identified, risks and benefits discussed, site marked, patient being monitored, timeout performed and fire risk safety assessment completed and verbalized      Pre-procedure: All elements of maximal sterile barrier technique followed?  Yes    2% Chlorhexidine for cutaneous antisepsis, Hand hygiene performed prior to catheter insertion and Ultrasound guidance    Sterile Ultrasound Technique followed?: Yes            Procedure:   Prep:  Chlorhexidine  Location: internal jugular  Orientation:  Right  Patient position:  Flat  Catheter type:  Double lumen  Catheter size:  9 Fr  Catheter length:  12 cm  Number of attempts:  1  Successful placement: Yes      Assessment:   Post-procedure:  Catheter secured and sterile dressing with CHG applied  Assessment:  Blood return through all ports  Insertion:  Uncomplicated  Patient tolerance:  Patient tolerated the procedure well with no immediate complications  9 Fr MAC and 8 Fr CCO PA Catheter

## 2023-01-12 NOTE — H&P
CRITICAL CARE NOTE      Name: Sharmin Abad   : 1995   MRN: [id number]   Date: 2023      REASON FOR ICU ADMISSION:  Cardiac Arrest s/p ROSC     PRINCIPAL ICU DIAGNOSIS   PEA Arrest with ROSC  Acute hypoxic respiratory failure  RUL pulmonary hemorrhage  Acute kidney injury  Cardiogenic vs obstructive shock    BRIEF PATIENT SUMMARY   28year old female with history of anxiety/depression admitted () s/p unclear etiology of PEA arrest with ROSC. History per chart review and EMS: syncopal episode earlier in the day, (no LOC/trauma), reported to feel like Javier Antoine was going to die\" currently on menstrual cycle, (previously noted to be on birth control, unclear if still taking) with generalized weakness and decreased appetite. On EMS arrival +lethargy, non- ambulatory, moaning, ROD x 4. Initial V/S 119/73, HR 60-70. Per EMS provider ~3-4 mins from ED, she became acutely unresponsive, HR 30s on monitor, no palpable pulse, EKG \"reading STEMI\" On ED arrival she was noted pulseless with initiation of ACLS, ROSC after 2 rounds, subsequent additional PEA arrest x 2 with ROSC. Due to profound hypotension and hypoxia despite pressors and max ventilator settings, decision was made to administer lytic therapy for possible massive PE. Post lytic therapy there was a significant decreased in her pressors requirement with some improvement in oxygenation. Code shock called, case discussed with CTS for possible ECMO. CTh- negative CTA chest (obtained ~ 1 hour post lytic therapy)no evidence of PE, + RUL consolidation. EKG post lytic with no signs of ischemia. ANDREW performed in OR demonstrated RV dilation/strain and bronchoscopy->grossly bloody fluid in RUL  Per CT surgery no currently indication for ECMO at this time with  with improvement in pressors requirement and oxygen saturation.  Admitted to ICU for continued support    COMPREHENSIVE ASSESSMENT & PLAN:SYSTEM BASED     24 HOUR EVENTS:   + Myoclonus, rapid EEG with no seizure burden  Targeted temperature management: Maintain normothermia 35.3-37.3, prevent fever  Repeat CXR in AM, on Epi/levo/vaso    NEUROLOGICAL:   Potential Anoxic injury in setting of cardiac arrest  Initial CT head with no acute intra-cranial process  + Myoclonus with concern for anoxic injury, no seizure burden on Rapid EEG  Maintain normothermia 35.3-37.3, Prevent fever  Close neurological monitoring    PULMONOLOGY:   Acute hypoxic respiratory failure, RUL consolidation/pulmonary hemorrhage  S/p bronchoscopy 1/11  grossly bloody fluid in RUL (defer to note)  Lung protective ventilation with goal plateau pressure < 30, driving pressure < 15  Repeat CXR in AM    CARDIOVASCULAR:   PEA arrest x 3 ,severe shock:obstructive vs cardiogenic,Etiology unclear: PE vs STEMI vs Arrhythmia vs other???  Lytic therapy administered d/t risk vs benefit with profound shock and cardiac arrest x 3, post therapy had improvement in pressor and oxygen requirement, CTA chest performed ~1 hour after administration, with no evidence of PE  ANDREW demonstrated RV dilation and strain, EKG with no signs of ischemia  Targeted temperature management: Maintain normothermia 35.3-37.3, prevent fever    GASTROINTESTINAL   NPO  Protonix for GI prophylaxis     RENAL/ELECTROLYTE/FLUIDS:   Acute kidney injury  Pre-renal 2/2 low flow state/hypotension  Replete electrolytes as indicated, maintain K > 4, Mg > 2  Goal urine output greater then 0.5 cc/kg/hr    ENDOCRINE:   Blood Sugar Goal 120-180, Avoid hypo/hyperglycemia  Glucose checks, SSI    HEMATOLOGY/ONCOLOGY:   S/p lytic therapy for possible massive PE (syncopal episode, PEA arrest, profound shock and hypoxemia: per chart review was previously on birth control-unclear if still on)   H/H Stable  SCDs for DVT prophylaxis    INFECTIOUS DISEASE:   RUL consolidation/hemorrhage  Continue empiric broad spectrum antibiotics as below    ANTIBIOTICS TO DATE:  Zosyn (1/11-current)  Vancomycin (-current)    CULTURES TO DATE:  Covid/Influenza- pending  UC:pending  RC: pending    ICU DAILY CHECKLIST     Code Status:FULL  DVT Prophylaxis:SCDs  T/L/D: Tubes: ETT  Lines: Peripheral IV, Arterial Line, and Central Line  Drains: Pagan Catheter  SUP: Protonix  Diet: NPO  Activity Level:Bedrest  ABCDEF Bundle/Checklist Completed:Yes  Disposition: Stay in ICU  Multidisciplinary Rounds Completed:  Pending  Goals of Care Discussion/Palliative: Pending  Patient/Family Updated: Micah 25   Review of Systems   Unable to perform ROS: Intubated      OBJECTIVE     Labs and Data: Reviewed 23  Medications: Reviewed 23  Imaging: Reviewed 23    Physical Exam  Vitals reviewed. Constitutional:       Appearance: She is obese. She is ill-appearing and toxic-appearing. Interventions: She is intubated. HENT:      Nose: Nose normal.      Mouth/Throat:      Mouth: Mucous membranes are dry. Eyes:      Comments: Non reactive   Cardiovascular:      Rate and Rhythm: Tachycardia present. Pulses: Normal pulses. Pulmonary:      Effort: She is intubated. Breath sounds: Rhonchi present. Comments: Coarse breath sounds throughout R > L  Abdominal:      Palpations: Abdomen is soft. Musculoskeletal:         General: Normal range of motion. Cervical back: Normal range of motion and neck supple. Skin:     General: Skin is warm and dry.    Neurological:      Comments: No withdrawal response to stimuli, +myoclonus   Psychiatric:      Comments: On MV unable to assess        Visit Vitals  /70   Pulse (!) 114   Temp 97.4 °F (36.3 °C)   Resp 30   Ht 5' 7\" (1.702 m)   Wt 83.9 kg (185 lb)   SpO2 100%   BMI 28.98 kg/m²      O2 Device: Ventilator Temp (24hrs), Av.4 °F (36.3 °C), Min:97.4 °F (36.3 °C), Max:97.4 °F (36.3 °C)    CVP (mmHg): 10 mmHg (23)      Intake/Output:     Intake/Output Summary (Last 24 hours) at 2023 2353  Last data filed at 1/11/2023 2136  Gross per 24 hour   Intake --   Output 600 ml   Net -600 ml       Imaging    01/11/23    OR ECHO ANDREW INTRAOP  1/11/2023    Narrative  See Anesthesia Procedure note for procedure details. CRITICAL CARE DOCUMENTATION  I had a face to face encounter with the patient, reviewed and interpreted patient data including clinical events, labs, images, vital signs, I/O's, and examined patient. I have discussed the case and the plan and management of the patient's care with the consulting services, the bedside nurses and the respiratory therapist.      NOTE OF PERSONAL INVOLVEMENT IN CARE   This patient has a high probability of imminent, clinically significant deterioration, which requires the highest level of preparedness to intervene urgently. I participated in the decision-making and personally managed or directed the management of the following life and organ supporting interventions that required my frequent assessment to treat or prevent imminent deterioration. I personally spent 120 minutes of critical care time. This is time spent at this critically ill patient's bedside actively involved in patient care as well as the coordination of care. This does not include any procedural time which has been billed separately.     Travis Putnam, MARYANN   Critical Care Medicine  [facility]

## 2023-01-13 PROBLEM — G93.6 CEREBRAL EDEMA (HCC): Status: ACTIVE | Noted: 2023-01-01

## 2023-01-13 PROBLEM — Z71.89 GOALS OF CARE, COUNSELING/DISCUSSION: Status: ACTIVE | Noted: 2023-01-01

## 2023-01-13 PROBLEM — Z51.5 PALLIATIVE CARE BY SPECIALIST: Status: ACTIVE | Noted: 2023-01-13

## 2023-01-13 NOTE — INTERDISCIPLINARY ROUNDS
Interdisciplinary team rounds were held 1/13/2023 with the following team members:Care Management, Nursing, Nutrition, Pharmacy, Physician, and Clinical Coordinator. Plan of care discussed. See clinical pathway and/or care plan for interventions and desired outcomes. Goals of the Day: Discuss goals and critical status with family.

## 2023-01-13 NOTE — PROGRESS NOTES
Comprehensive Nutrition Assessment    Type and Reason for Visit: Initial, Consult    Nutrition Recommendations/Plan:   Initiate TF via OGT:  Start TwoCal HN @ 15mL/h + 200mL flush q 4h (provides 720kcals/30gPro/79gCHO/1452mL)      Malnutrition Assessment:  Malnutrition Status:  Insufficient data (01/13/23 1240)      Nutrition Assessment:  Pt admitted with cardiac arrest.  PMH: Psych hx. Chart reviewed, case discussed during CCU rounds. Pt intubated and sedated with propofol @ 20.1mL/h, which provides 530 kcals daily. She is having myoclonus and per Neuro notes high likeliness for anoxic brain injury. Palliative and Intensivist meeting with the family. She was hypoglycemic overnight (BG in the 50's). Na up to 154 after hypertonic saline given in the past 24h. Per IDR discussion okay to start trophic TF with 200mL flush q 4h to help bring down Na level and prevent hypoglycemia. MST not filled out, no family to speak with at this time re:nutrition status PTA. Nutrition Related Findings:    Meds: humalog, protonix, zosyn, vancomycin, [email or web address]; Drips: propofol, fentanyl. BM PTA Wound Type: None    Current Nutrition Intake & Therapies:  Average Meal Intake: NPO         Anthropometric Measures:  Height: 5' 7\" (170.2 cm)  Ideal Body Weight (IBW): 135 lbs (61 kg)     Current Body Wt:  83.9 kg (184 lb 15.5 oz), 137 % IBW. Not specified  Current BMI (kg/m2): 29                          BMI Category: Overweight (BMI 25.0-29. 9)    Estimated Daily Nutrient Needs:  Energy Requirements Based On: Formula  Weight Used for Energy Requirements: Current  Energy (kcal/day): PSU 2030 (MSJ 1602)  Weight Used for Protein Requirements: Current  Protein (g/day): 67-84g (0.8-1gPro/kg)  Method Used for Fluid Requirements: Other (comment)  Fluid (ml/day): per MD    Nutrition Diagnosis:   Inadequate protein-energy intake related to impaired respiratory function as evidenced by NPO or clear liquid status due to medical condition    Nutrition Interventions:   Food and/or Nutrient Delivery: Start tube feeding  Nutrition Education/Counseling: No recommendations at this time  Coordination of Nutrition Care: Continue to monitor while inpatient, Interdisciplinary rounds       Goals:     Goals: Initiate nutrition support, by next RD assessment       Nutrition Monitoring and Evaluation:   Behavioral-Environmental Outcomes: None identified  Food/Nutrient Intake Outcomes: Enteral nutrition intake/tolerance  Physical Signs/Symptoms Outcomes: Biochemical data, Nutrition focused physical findings, Skin, Weight, GI status, Fluid status or edema    Discharge Planning:     Too soon to determine    Sheridan Paulino RD, CNSC  Contact: ext [phone]

## 2023-01-13 NOTE — CONSULTS
Palliative Medicine Consult  Sergei: [phone] ([phone])    Patient Name: Susanne Crum  YOB: 1995    Date of Initial Consult: 1/12/23  Reason for Consult: Care Decisions  Requesting Provider: Sandra Chris MD   Primary Care Physician: Not, On File, MD     SUMMARY:   Susanne Crum is a 28 y.o. with a past history of depression, who was admitted on 1/11/2023 from home with a diagnosis of cardiac arrest x3- total downtime around 10 min. PALLIATIVE DIAGNOSES:   Goals of care  Cardiac Arrest  Anoxic myoclonus  Cerebral Edema  Palliative Care     PLAN:   Met Francie's mom today- she was at bedside. She shared that she is so glad that the myoclonus has stopped because it was really traumatizing her  and he was so emotional this morning that he could not come to the hospital.  She has one other child- her son who is a  and a great support to her through all of this. She shared with me Francie's struggle with extreme grief and depression over the last 2-3 years that started with the sudden death of her sister-in-law. She was in her early 19's and passed from breast cancer shortly after giving birth to their child. This was then compounded by the death of two close friends of Amanda within that same year (a friend from her dance school here in [place], and one of her dance teachers who was only in his mid 29's). Her depression and grief was not really treated as she was no longer a minor so had to agree to treatment and as this was during [facility] no outpatient psychiatrists were taking new patients. They did find a counselor who was willing to take her on as a new patient, but Mony Hendrix would not talk when she was going through her depression, which made counseling impossible. Mrs Gus Lee shared that she was advised that unless Mony Hendrix became suicidal there was not much that could be done.     Her depression has gotten in somewhat of a rhythm, in line with her menstrual cycle- starting about a week prior. She will stop eating, talking, sleeping etc and then towards the end of her cycle she will sleep really well, and her appetite will come back. 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 The family wants to make absolutely sure that we are basing not our prognosis on a clinical exam that could be influenced by her mental health issues- I reassured her that EEGs cannot  mood, and reflexes will be present no matter if someone has severe depression that might be muting other forms of responses. Dr Setrling James joined us at this point and talked her through his concerns that she clinically looks worse today given her lessened reflexes even though she is on less sedation, and the follow up CT that is showing cerebral edema. He re-emphasized that the prognosis is very poor at this stage. We also talked about brain death, but how the likelihood that she will actually meet that criteria is slim- but it doesn't mean that she will recover. It just unfortunately means that we are in a grey area where hard decisions will have to be made. Mrs Margareth Sanchez is very analytical- and she is a  for the state of [place] so understands some of this better than the average layperson- even so this is her daughter. I will meet with them again on Monday-she knows that the plan is to get her off sedation by tomorrow morning so a true assessment of her neurological status can be made. No decisions will be made before tomorrow.    Initial consult note routed to primary continuity provider and/or primary health care team members  Communicated plan of care with: [facility]     GOALS OF CARE / TREATMENT PREFERENCES:     GOALS OF CARE:  Patient/Health Care Proxy Stated Goals: Other (comment) (evolving)    TREATMENT PREFERENCES:   Code Status: Full Code    Advance Care Planning:  [x] [facility] Interdisciplinary Team has updated the ACP Navigator with [address] and Patient Capacity      No flowsheet data found. Medical Interventions: Full interventions       Other:    As far as possible, the palliative care team has discussed with patient / health care proxy about goals of care / treatment preferences for patient.      HISTORY:     History obtained from: chart, and mom    CHIEF COMPLAINT: none- unresponsive    HPI/SUBJECTIVE:    The patient is:   [] Verbal and participatory  [x] Non-participatory due to:      Clinical Pain Assessment (nonverbal scale for severity on nonverbal patients):   Clinical Pain Assessment  Severity: 0          Duration: for how long has pt been experiencing pain (e.g., 2 days, 1 month, years)  Frequency: how often pain is an issue (e.g., several times per day, once every few days, constant)     FUNCTIONAL ASSESSMENT:     Palliative Performance Scale (PPS):  PPS: 30       PSYCHOSOCIAL/SPIRITUAL SCREENING:     Palliative IDT has assessed this patient for cultural preferences / practices and a referral made as appropriate to needs (Cultural Services, Patient Advocacy, Ethics, etc.)    Any spiritual / Spiritism concerns:  [] Yes /  [x] No   If \"Yes\" to discuss with pastoral care during IDT     Does caregiver feel burdened by caring for their loved one:   [] Yes /  [x] No /  [] No Caregiver Present/Available [] No Caregiver [] Pt [facility] at [facility]  If \"Yes\" to discuss with social work during IDT    Anticipatory grief assessment:   [x] Normal  / [] Maladaptive     If \"Maladaptive\" to discuss with social work during IDT    ESAS Anxiety: Anxiety: 0    ESAS Depression: Depression: 0        REVIEW OF SYSTEMS:     Positive and pertinent negative findings in ROS are noted above in HPI. The following systems were [x] reviewed / [] unable to be reviewed as noted in HPI  Other findings are noted below. Systems: constitutional, ears/nose/mouth/throat, respiratory, gastrointestinal, genitourinary, musculoskeletal, integumentary, neurologic, psychiatric, endocrine. Positive findings noted below. Modified ESAS Completed by: provider   Fatigue: 10     Depression: 0 Pain: 0   Anxiety: 0 Nausea: 0     Dyspnea: 0                    PHYSICAL EXAM:     From RN flowsheet:  Wt Readings from Last 3 Encounters:   01/11/23 185 lb (83.9 kg)   03/14/21 184 lb 11.9 oz (83.8 kg)     Blood pressure 124/83, pulse (!) 126, temperature (!) 100.9 °F (38.3 °C), resp. rate 26, height 5' 7\" (1.702 m), weight 185 lb (83.9 kg), SpO2 98 %.     Pain Scale 1: Behavioral Pain Scale (BPS)                    Last bowel movement, if known:     Constitutional: sedated   Cardiovascular: regular rhythm, distal pulses intact  Respiratory: vent support  Gastrointestinal: soft non-tender, +bowel sounds  Musculoskeletal: no deformity, no tenderness to palpation  Skin: warm, dry  Other:       HISTORY:     Active Problems:    Cardiac arrest ([address] (1/11/2023)      Heart disease (1/11/2023)      Cardiogenic shock ([address] (1/12/2023)      Acute respiratory failure with hypoxia (HCC) (1/12/2023)      Lactic acidosis (1/12/2023)      Type 2 diabetes mellitus with hyperglycemia, without long-term current use of insulin ([address] (1/12/2023)      Myoclonus (1/12/2023)    Past Medical History:   Diagnosis Date    Acute respiratory failure with hypoxia ([address] 1/12/2023    Cardiogenic shock ([address] 1/12/2023    Lactic acidosis 1/12/2023    Myoclonus 1/12/2023    Psychiatric disorder     anxiety, depression    Type 2 diabetes mellitus with hyperglycemia, without long-term current use of insulin ([facility] 1/12/2023      No past surgical history on file. No family history on file. History reviewed, no pertinent family history.   Social History     Tobacco Use    Smoking status: Never    Smokeless tobacco: Not on file   Substance Use Topics    Alcohol use: Not Currently     Allergies   Allergen Reactions    Escitalopram Other (comments)     Muscle pain and heart palpitations    Zoloft [Sertraline] Other (comments)     insomnia      Current Facility-Administered Medications   Medication Dose Route Frequency    labetaloL (NORMODYNE;TRANDATE) injection 10 mg  10 mg IntraVENous Q4H PRN    enoxaparin (LOVENOX) injection 40 mg  40 mg SubCUTAneous Q24H    fentaNYL (PF) 1,500 mcg/30 mL (50 mcg/mL) infusion  50 mcg/hr IntraVENous CONTINUOUS    hydrALAZINE (APRESOLINE) 20 mg/mL injection 10 mg  10 mg IntraVENous Q6H PRN    alcohol 62% (NOZIN) nasal  1 Ampule  1 Ampule Topical Q12H    acetaminophen (TYLENOL) tablet 650 mg  650 mg Oral Q4H PRN    Or    acetaminophen (TYLENOL) suppository 650 mg  650 mg Rectal Q4H PRN    glucose chewable tablet 16 g  4 Tablet Oral PRN    glucagon (GLUCAGEN) injection 1 mg  1 mg IntraMUSCular PRN    dextrose 10% infusion 0-250 mL  0-250 mL IntraVENous PRN    insulin lispro (HUMALOG) injection   SubCUTAneous Q4H    lactated Ringers infusion  100 mL/hr IntraVENous CONTINUOUS    pantoprazole (PROTONIX) 40 mg in 0.9% sodium chloride 10 mL injection  40 mg IntraVENous Q12H    chlorhexidine (ORAL CARE KIT) 0.12 % mouthwash 15 mL  15 mL Oral Q12H    piperacillin-tazobactam (ZOSYN) 3.375 g in 0.9% sodium chloride (MBP/ADV) 100 mL MBP  3.375 g IntraVENous Q8H    vancomycin (VANCOCIN) 750 mg in 0.9% sodium chloride 250 mL ([id number])  750 mg IntraVENous Q12H    propofol (DIPRIVAN) 10 mg/mL infusion  0-50 mcg/kg/min IntraVENous TITRATE          LAB AND IMAGING FINDINGS:     Lab Results   Component Value Date/Time    WBC 25.7 (H) 01/13/2023 02:44 AM    HGB 11.1 (L) 01/13/2023 02:44 AM    PLATELET 381 (L) 89/46/6087 02:44 AM     Lab Results   Component Value Date/Time    Sodium 153 (H) 01/13/2023 02:33 PM    Potassium 3.1 (L) 01/13/2023 02:33 PM    Chloride 123 (H) 01/13/2023 02:33 PM    CO2 22 01/13/2023 02:33 PM    BUN 19 01/13/2023 02:33 PM    Creatinine 1.38 (H) 01/13/2023 02:33 PM    Calcium 7.9 (L) 01/13/2023 02:33 PM    Magnesium 2.0 01/13/2023 02:44 AM    Phosphorus 3.2 01/13/2023 02:44 AM      Lab Results   Component Value Date/Time    Alk. phosphatase 90 01/13/2023 02:44 AM    Protein, total 5.9 (L) 01/13/2023 02:44 AM    Albumin 2.9 (L) 01/13/2023 02:44 AM    Globulin 3.0 01/13/2023 02:44 AM     No results found for: INR, PTMR, PTP, PT1, PT2, APTT, INREXT, INREXT   No results found for: IRON, FE, TIBC, IBCT, PSAT, FERR   Lab Results   Component Value Date/Time    pH 7.38 01/12/2023 04:44 AM    PCO2 25 (L) 01/12/2023 04:44 AM    PO2 322 (H) 01/12/2023 04:44 AM     No components found for: [place]   Lab Results   Component Value Date/Time    CK 8,708 (H) 01/13/2023 02:44 AM                Total time: 75m  Counseling / coordination time, spent as noted above: 50  > 50% counseling / coordination?: y    Prolonged service was provided for  []30 min   []75 min in face to face time in the presence of the patient, spent as noted above. Time Start:   Time End:   Note: this can only be billed with 15617 (initial) or 52168 (follow up). If multiple start / stop times, list each separately.

## 2023-01-13 NOTE — PROGRESS NOTES
Report received from Irlanda Ferris RN. Assumed care of patient at 1700. For repeat labs at 1830. Current Potassium slightly low, reported to Dr. Luann Bourgeois. No orders noted yet. 1918 -1324 labs still pending. Report given to Tong Moreira RN who had patient previously. Updates reviewed.

## 2023-01-13 NOTE — PROGRESS NOTES
0700 Bedside report from MONSE Waller. Patient decorticates and open eyes to sternal rubbing, with a fixated upper gate. Patient has an art line, a swan-sarita, a CO monitor. She is on profofol at 40mcg. She is currently supine at a 30 deg angle. 6905 Patient blood checked via PIV result 54, then rechecked via Fem triple lumen result 64. Initiated D10 at 125 ml/hr per protocol. Will recheck in 10 mins. 2670 Blood now at 128 mg/dl  0930 Per Dr. John Glover, PRN labetalol will be implement for hypertension (systolic >247). 1050 Tube feeds initiated 2 thomas at 15 ml/hr continuously. Fentanyl re-initiated at 50 mcg/hr continuously. 1100 Patient turned to left side. Family at bedside. 1150 Dr John Glover and palliative team at bedside to discuss prognosis with family. 1320 PRN labetatol given for systolic greater than 482.  1400 Patient repositioned to the right. 1600 Repositioned to the left. PRN hydralazine given.   1700 Report given to Aman Due

## 2023-01-13 NOTE — PROGRESS NOTES
Follow up visit in the CCU. Patient intubated and unresponsive at this time.  called family and extended pastoral support through empathetic listening and affirmation. Silent prayer offered at patient's door. Family were appreciative of call.       Visited by: Missy shearer : [phone])

## 2023-01-13 NOTE — PROGRESS NOTES
Transition of Care  Please note that the assessment was completed with mrs. Macdonald's mother, Donny Conrad. Ms. Ariella Canchola is vented and sedated at this time. Reason for Admission:  PEA Arrest and ROSC                     RUR Score:   10%                  Plan for utilizing home health:  Mrs. Donny Conrad is agreeable to utilize home health services for [facility], if needed. PCP: First and Last name:  Not, On File, MD     [facility]    Are you a current patient: Yes/No: Yes   Approximate date of last visit: 1/1/2022   Can you participate in a virtual visit with your PCP: Unknown                    Current Advanced Directive/Advance Care Plan: Full Code  Mrs. Donny Conrad reported that her daughter does not have an advance care plan. Healthcare Decision Maker:   Click here to complete [address] including selection of the Healthcare Decision Maker Relationship (ie \"Primary\")  Ms. Corrie Pires primary healthcare decision makers are her parents:    Donny Conrad, mother - [phone]  Darwin May, father - [phone]                  Transition of Care Plan:   Mrs. Donny Conrad was seen in [facility] room. She verified Francie address and phone number. They live in a 2 story, single family residence with 2 steps to enter. She does not have medical equipment. She was independent with her ADLs and IADLs prior to her admission. She is not employed. She does drive. Her pharmacy is the [email or web address] on [address].  can afford and acquire her medications. Mr. Bobby Canchola will provide transportation at discharge. The discharge plan is to be determined, based on Ms. Macdonald's recovery. Care Management Interventions  PCP Verified by CM:  Yes  Last Visit to PCP: 01/01/22  Palliative Care Criteria Met (RRAT>21 & CHF Dx)?: No  Mode of Transport at Discharge: ALS  Transition of Care Consult (CM Consult): Discharge Planning  MyChart Signup: No  Discharge Durable Medical Equipment: No  Physical Therapy Consult: No  Occupational Therapy Consult: No  Speech Therapy Consult: No  Support Systems: Parent(s), Other Family Member(s)  Confirm Follow Up Transport: Family  The Patient and/or Patient Representative was Provided with a Choice of Provider and Agrees with the Discharge Plan?: No  Freedom of Choice List was Provided with Basic Dialogue that Supports the Patient's Individualized Plan of Care/Goals, Treatment Preferences and Shares the Quality Data Associated with the Providers?: No   Resource Information Provided?: No  Discharge Location  Patient Expects to be Discharged to[de-identified] [facility])    Will continue to follow for discharge planning.   Signed By: Katlin Espitia LCSW     January 13, 2023

## 2023-01-13 NOTE — PROGRESS NOTES
Pharmacy Antimicrobial Kinetic Dosing    Indication for Antimicrobials: sepsis     Current Regimen of Each Antimicrobial:  Vancomycin 2000 mg IV then 750 mg IV Q12H (Start Date ; Day # 2)  Zosyn 3.375 g IV Q8H (Start Date ; Day #2)    Previous Antimicrobial Therapy:    Goal Level: AUC: 400-600 mg/hr/Liter/day    Date Dose & Interval Measured (mcg/mL) Predicted AUC/ALVA    750 mg IV Q12H 15.8 499                 Date & time of next level: TBD    Dosing calculator used: [employer] calculator    Significant Positive Cultures:   NA    Conditions for Dosing Consideration: None    Labs:  Recent Labs     23  1026 23  0244 23  2039   CREA 1.48* 1.53* 1.58*   BUN 21* 24* 24*   PCT  --  36.81  --      Recent Labs     23  0244 23  0445 23  2236 23  1925   WBC 25.7* 30.2* 25.1* 13.6*     Temp (24hrs), Av °F (37.8 °C), Min:99.3 °F (37.4 °C), Max:101.1 °F (38.4 °C)    Creatinine Clearance (mL/min):   CrCl (Adjusted Body Weight): 63.0 mL/min   If actual weight < IBW: CrCl (Actual Body Weight) 75.0    Impression/Plan:   Vancomycin trough resulted, , continue current regimen  Zosyn marcelino  Antimicrobial stop date TBD     Pharmacy will follow daily and adjust medications as appropriate for renal function and/or serum levels.     Thank you,  Basilio Cao, PHARMD

## 2023-01-13 NOTE — PROGRESS NOTES
1946  Bedside and Verbal shift change report given to Bhupendra Schmid RN (oncoming nurse) by John Tierney RN (offgoing nurse). Report included the following information SBAR, Kardex, ED Summary, Intake/Output, MAR, Recent Results, and Cardiac Rhythm ST .       2042  Reviewed glucostabilizer with NP.  D/C'd at this time, but order for Q4 hr BG's given. Checked BG with accucheck and got value of 66. Unexpected reading, so informed NP and received order for stat BMP and recheck off art line. Recheck 114. Patient not on pressors at time of BG nor does she have edema in extreities. Also discussed sudden drop in UOP to 20 ml/hr. 2300  NP at bedside frequently due to patient's BP and continued myoclonic activity. Discussed increased CVP and BP. Concern for cerebral edema starting given that patient is now around 24 hours from original anoxic event. Will get to CT as soon as possible. No real response to bolus' of fentanyl or Versed. Per NP, would like CPP . Discussed acute increase of CVP to 15-20 mmHg. Relayed concern that SBP and MAP continue to remain elevated. Per NP, better to have higher perfusion pressure for brain tissue than risk hypoperfusion and patient is in proper range at this time. Left for CT 2340    Returned from [facility] 178. Complete bath done with linen change. 5351 Blair Torrez. in consult to neurosurgery. 0144  Call from Dr. Kirsten Freire from Neurosurgery. Provided overview of patient history and reason for consult. Placed NP on phone with provider. Per NP, Dr. Kirsten Freire unable to offer any assistance from a neurosurgery perspective. Versed-5 ml wasted with Luly Dey RN due to provider canceling order earlier. 0241  BG 71.  NP at bedside. Concerned for patient going hypoglycemic, but also wants to not give free water due to goal of making patient hypernatremic for her cerebral swelling. Will give D50 and follow up in 1 hour. 0330  D50 given-had to get from pharmacy.   Labs pending. 0745  Bedside and Verbal shift change report given to [place], RN and Fernie Malcolm RN (oncoming nurse) by Blanka Mendez RN (offgoing nurse). Report included the following information SBAR, Kardex, ED Summary, Procedure Summary, MAR, Recent Results, and Cardiac Rhythm ST . Noted small area at medial ridge of left breast where there may have been some sheering during CPR. Site is dry and intact and appears superficial.    Relayed that [employer] has been contacted and is following, but patient is not a designated donor and they have not talked with family regarding patient wishes.

## 2023-01-14 NOTE — PROGRESS NOTES
1900 Bedside shift report received from [facility]    5326 assessment completed, refer to the complex assessment flow sheet. 2148 Contact NP regarding pt hypotension, fentanyl drip stopped per NP request.orders received. 2212 propofol drip stopped per NP order    0000 reassessment completed, no significant changes from the previous assessment. 0400 reassessment completed, no significant changes from the previous assessment. 0600 updated NP on morning lab values, orders received.     0720 Bedside shift report given to [place], RN and Nas dior RN

## 2023-01-14 NOTE — PROGRESS NOTES
0720 Bedside report with Geno Santana RN  0730 Initial assessment performed. Patient is unresponsive, on support. Temp is 96.8. Pt has not reacted to any neuro checks, pupils are fixed and huge. MD notified. 0800 Patient turned to right side. 0950 Partially bathed. Patient turned to left side. 1000 Family arrived at bedside. 1200 Patient received complete bath, turned to the right. 1220 Patient became hypotensive per art line reading. Readjusted the arm to no avail. Levo increased to 10 mcg/min. Patient is responsive. 1230 Art line pressures elevated diastolically. Levo dropped to 9 mcg/min. Will continue to wean off levo if sustained. 1235 Weaned to 8 mcg/min. 1400 Patient turned to the left. Waveforms on art line are dampened. 1520 Dr. Vin Nolasco at bedside with family. 1630 Patient reassessed. Turned to the left side. 1830 Patient positioned supine. 1900 Bedside report given to Geno Santana RN.

## 2023-01-14 NOTE — PROGRESS NOTES
ICU DAILY PROGRESS NOTE      Summary  28year old female with history of anxiety/depression admitted (1/11) s/p unclear etiology of PEA arrest with ROSC. History per chart review and EMS: syncopal episode earlier in the day, (no LOC/trauma), reported to feel like Becky Schmitt was going to die\" currently on menstrual cycle, (previously noted to be on birth control, unclear if still taking) with generalized weakness and decreased appetite. Pthad cardiac arrest x2 with ROSC. She was clinically treated for PE receiving thrombolytics in ED with oxygenation and hemodynamic improvement about an hour after thrombolysis. However, no evidence of PE found on CT chest.    During her resuscitaiton a swan was planced and she had low CI that improved with inotropes and pressors. Etiology of cardiogenic shock unclear. Family Meeting:  Provided pt's mother update at bedside. Informed that that pt had CT head overnight showing cerebral edema. Prognosis remains poor. We discussed that we are providing clinical support but that the current Neuro exam and EEG suggesting anoxic brain injury. Family is optimistic and want to give more time and continue current level of care. Hospital Course    1/14: Off sedation. Remains unresponsive. 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 No evidence of seizures.  Myoclonus appears to be consistent with anoxic brain injury. EEG also consistent with anoxic brain injury. - continue normo-thermia  - glycemic control  - repeat imaging overnight showed cerebral edema  - clinical finding and imaging suggest poor prognosis. CVS    Cardiac arrest- PEA  Cardiogenic shock  - etiology unclear  - SVO2- 74 and  CI improved to 4.1; bedside echo from today appears to show normal contractility, will follow up final report. - continue inotropes and pressors weaned off  - maintain MAP > 65    Pulm    Acute respiratory failure    - initially PE suspected which based on history and clinical picture appeared likely. CT chest showed no evidence of PE. Considering that she has below the knee dvt; cardiac function appears to have recovered based on swan numbers and weaning off of inotropes and pressors. It is possible pt ahd transient PE dissolved by the thrombolytics. .   - continue vent support. - will assess daily for appropriateness of SBT since pt probably suffered significant anoxic brain injury. GI  - GI prophylaxis with protonix  - continue tube feeds.     Renal    RHODA  Hypernatremia  Hypokalemia  Metabolic acidosis  - trend BUN/Cr  - trend UOP  - replete lytes as needed  - increase free water to correct hypernatremia  - trend BMP and lactate      Endo    Hyperglycemia  BHCG negative    - started on insulin drip for tighter glycemic control  - has menstrual cycle; unclear if still on birth control  - HgBA1C pending    ID    RUL consolidation/hemorrhage  Leukocytosis  - continue abx    Heme  - s/p thrombolytics  - trend H/H    Vitals  Visit Vitals  /84   Pulse 96   Temp 97.2 °F (36.2 °C)   Resp 26   Ht 5' 7\" (1.702 m)   Wt 83.9 kg (185 lb)   SpO2 96%   BMI 28.98 kg/m²      O2 Device: Endotracheal tube Temp (24hrs), Av.9 °F (36.6 °C), Min:96.8 °F (36 °C), Max:100.3 °F (37.9 °C)    CVP (mmHg): 18 mmHg (23 1638)      Intake/Output:     Intake/Output Summary (Last 24 hours) at 2023 0915  Last data filed at 1/14/2023 1331  Gross per 24 hour   Intake 5236.32 ml   Output 5600 ml   Net -363.68 ml           Physical exam:  Physical Exam  HENT:      Head: Normocephalic. Eyes:      Pupils: Pupils are equal, round, and reactive to light. Cardiovascular:      Rate and Rhythm: Regular rhythm. Heart sounds: Normal heart sounds. Pulmonary:      Effort: Pulmonary effort is normal.      Breath sounds: Normal breath sounds. Abdominal:      Palpations: Abdomen is soft. Skin:     General: Skin is warm and dry. I have examined the patient on this day 1/14/2023 and the above documented exam is accurate including the components that have been copied forward    LABS AND  DATA: Personally reviewed  Recent Labs     01/14/23 0434 01/13/23 0244   WBC 16.4* 25.7*   HGB 9.1* 11.1*   HCT 27.1* 32.2*   * 128*       Recent Labs     01/14/23 0434 01/13/23  1829 01/13/23  1026 01/13/23 0244   * 153*   < > 152*   K 3.1* 3.0*   < > 3.6   * 123*   < > 121*   CO2 22 21   < > 20*   BUN 16 18   < > 24*   CREA 1.21* 1.30*   < > 1.53*   * 138*   < > 110*   CA 8.2* 7.9*   < > 8.4*   MG 2.3  --   --  2.0   PHOS 1.0*  --   --  3.2    < > = values in this interval not displayed. Recent Labs     01/14/23 0434 01/13/23 0244 01/11/23  2236 01/11/23  1925   AP 87 90   < > 92   TP 5.3* 5.9*   < > 6.7   ALB 2.5* 2.9*   < > 3.2*   GLOB 2.8 3.0   < > 3.5   LPSE  --   --   --  133    < > = values in this interval not displayed. No results for input(s): INR, PTP, APTT, INREXT, INREXT in the last 72 hours.    Recent Labs     01/11/23  1950   PHI 6.92*   PCO2I 77.4*   PO2I 96   FIO2I 100       Recent Labs     01/13/23  0244   CPK 8,708*         Hemodynamics:   PAP: PAP Systolic: 36 (46/04/22 0935) CO: CO (l/min): 6.5 l/min (01/14/23 1638)   Wedge: PAOP (PCWP) (mmhg): 22 mmHg (01/14/23 1638) CI: CI (l/min/m2): 3.4 l/min/m2 (01/14/23 1638)   CVP:  CVP (mmHg): 18 mmHg (01/14/23 1638) SVR:       PVR: Ventilator Settings:  Mode Rate Tidal Volume Pressure FiO2 PEEP   Assist control   350 ml    30 % 8 cm H20     Peak airway pressure: 21 cm H2O    Minute ventilation: 9.33 l/min        MEDS: Reviewed    Chest X-Ray:  CXR Results  (Last 48 hours)      None                  Multidisciplinary Rounds Completed:  Yes      DISPOSITION  Stay in ICU    CRITICAL CARE CONSULTANT NOTE  I had a in-person encounter with Chet Bowden, reviewed and interpreted patient data including events, labs, images, vital signs, I/O's, and examined patient. I have discussed the case and the plan and management of the patient's care with the consulting services, the bedside nurses and the respiratory therapist.      NOTE OF PERSONAL INVOLVEMENT IN CARE   This patient is at high risk for sudden and clinically significant deterioration, which requires the highest level of preparedness to intervene urgently. I participated in the decision-making and personally managed or directed the management of the following life and organ supporting interventions that required my frequent assessment to treat or prevent imminent deterioration. I personally spent 40 minutes of critical care time. This is time spent at patient's bedside actively involved in patient care as well as the coordination of care and discussions with the patient's family. This does not include any procedural time which has been billed separately.       ------------------------------------------------------------------------------    Leisa Miller, [address] [facility]  [phone]

## 2023-01-14 NOTE — PROGRESS NOTES
ICU DAILY PROGRESS NOTE      Summary  28year old female with history of anxiety/depression admitted (1/11) s/p unclear etiology of PEA arrest with ROSC. History per chart review and EMS: syncopal episode earlier in the day, (no LOC/trauma), reported to feel like Hiren Rojo was going to die\" currently on menstrual cycle, (previously noted to be on birth control, unclear if still taking) with generalized weakness and decreased appetite. Pthad cardiac arrest x2 with ROSC. She was clinically treated for PE receiving thrombolytics in ED with oxygenation and hemodynamic improvement about an hour after thrombolysis. However, no evidence of PE found on CT chest.    During her resuscitaiton a swan was planced and she had low CI that improved with inotropes and pressors. Etiology of cardiogenic shock unclear. Family Meeting:  Provided pt's mother update at bedside. Informed that that pt had CT head overnight showing cerebral edema. Prognosis remains poor. We discussed that we are providing clinical support but that the current Neuro exam and EEG suggesting anoxic brain injury. Previous 24 Hours    Diagnosed with below the knee DVT and had emergent CT head overnight demonstrating cerebral edema    Dr. Fred Stone, Sr. 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 No evidence of seizures.  Myoclonus appears to be consistent with anoxic brain injury. EEG also consistent with anoxic brain injury. - continue normo-thermia  - glycemic control  - repeat imaging overnight showed cerebral edema  - clinical finding and imaging suggest poor prognosis. CVS    Cardiac arrest- PEA  Cardiogenic shock  - etiology unclear  - SVO2- 74 and  CI improved to 4.1; bedside echo from today appears to show normal contractility, will follow up final report. - continue inotropes and pressors weaned off  - maintain MAP > 65    Pulm    Acute respiratory failure    - initially PE suspected which based on history and clinical picture appeared likely. CT chest showed no evidence of PE. Considering that she has below the knee dvt; cardiac function appears to have recovered based on swan numbers and weaning off of inotropes and pressors. It is possible pt ahd transient PE dissolved by the thrombolytics. .   - continue vent support. - will assess daily for appropriateness of SBT since pt probably suffered significant anoxic brain injury. GI  - GI prophylaxis with protonix  - continue tube feeds.     Renal    RHODA  Hypernatremia  Hypokalemia  Metabolic acidosis  - trend BUN/Cr  - trend UOP  - replete lytes as needed  - increase free water to correct hypernatremia  - trend BMP and lactate      Endo    Hyperglycemia  BHCG negative    - started on insulin drip for tighter glycemic control  - has menstrual cycle; unclear if still on birth control  - HgBA1C pending    ID    RUL consolidation/hemorrhage  Leukocytosis  - continue abx    Heme  - s/p thrombolytics  - trend H/H    Vitals  Visit Vitals  /70   Pulse (!) 119   Temp (!) 100.9 °F (38.3 °C)   Resp 26   Ht 5' 7\" (1.702 m)   Wt 83.9 kg (185 lb)   SpO2 97%   BMI 28.98 kg/m²      O2 Device: Endotracheal tube Temp (24hrs), Av.1 °F (37.8 °C), Min:99.3 °F (37.4 °C), Max:101.1 °F (38.4 °C)    CVP (mmHg): 13 mmHg (23 1800)      Intake/Output:     Intake/Output Summary (Last 24 hours) at 2023 190  Last data filed at 1/13/2023 1800  Gross per 24 hour   Intake 3608.05 ml   Output 1470 ml   Net 2138.05 ml           Physical exam:  Physical Exam  HENT:      Head: Normocephalic. Eyes:      Pupils: Pupils are equal, round, and reactive to light. Cardiovascular:      Rate and Rhythm: Regular rhythm. Tachycardia present. Heart sounds: Normal heart sounds. Pulmonary:      Effort: Pulmonary effort is normal.      Breath sounds: Normal breath sounds. Abdominal:      Palpations: Abdomen is soft. Skin:     General: Skin is warm and dry. Neurological:      Comments: Sedated; no myoclonic jerking. Minimal pupillary and corneal reflexes          I have examined the patient on this day 1/13/2023 and the above documented exam is accurate including the components that have been copied forward    LABS AND  DATA: Personally reviewed  Recent Labs     01/13/23 0244 01/12/23 0445   WBC 25.7* 30.2*   HGB 11.1* 13.1   HCT 32.2* 37.4   * 174       Recent Labs     01/13/23  1433 01/13/23  1026 01/13/23 0244 01/12/23 2039 01/12/23  1605 01/12/23 0445   * 154* 152*   < > 153* 150*   K 3.1* 3.3* 3.6   < > 3.8 2.9*   * 124* 121*   < > 121* 118*   CO2 22 21 20*   < > 22 17*   BUN 19 21* 24*   < > 22* 22*   CREA 1.38* 1.48* 1.53*   < > 1.54* 1.93*   * 163* 110*   < > 94 237*   CA 7.9* 8.0* 8.4*   < > 8.3* 8.1*   MG  --   --  2.0  --  2.0 2.2   PHOS  --   --  3.2  --   --  3.6    < > = values in this interval not displayed. Recent Labs     01/13/23  0244 01/12/23 0445 01/11/23 2236 01/11/23  1925   AP 90 111   < > 92   TP 5.9* 5.9*   < > 6.7   ALB 2.9* 2.8*   < > 3.2*   GLOB 3.0 3.1   < > 3.5   LPSE  --   --   --  133    < > = values in this interval not displayed. No results for input(s): INR, PTP, APTT, INREXT, INREXT in the last 72 hours.    Recent Labs     01/11/23  1950   PHI 6.92*   PCO2I 77.4*   PO2I 96   FIO2I 100       Recent Labs     01/13/23  0244   CPK 8,708*       Hemodynamics:   PAP: PAP Systolic: 38 (98/64/23 7477) CO: CO (l/min): 10.3 l/min (01/13/23 1600)   Wedge:   CI: CI (l/min/m2): 5.3 l/min/m2 (01/13/23 1600)   CVP:  CVP (mmHg): 13 mmHg (01/13/23 1800) SVR:       PVR:       Ventilator Settings:  Mode Rate Tidal Volume Pressure FiO2 PEEP   Assist control   350 ml    30 % 8 cm H20     Peak airway pressure: 21 cm H2O    Minute ventilation: 9.42 l/min        MEDS: Reviewed    Chest X-Ray:  CXR Results  (Last 48 hours)                 01/12/23 0506  XR CHEST PORT Final result    Impression:  Improved consolidative opacity right upper lung with slightly   worsened appearance to diffuse patchy opacities bilaterally in a pattern   suggestive of pneumonic infiltrate with superimposed alveolar edema. Narrative:  EXAM:  XR CHEST PORT       INDICATION: Intubated. COMPARISON: CT 1/11/2023 and chest x-ray 1/11/2023. TECHNIQUE: Semierect portable chest AP view       FINDINGS: Endotracheal tubes project in stable and expected position with the   tip at approximately 3 cm above the chuyita. There is been placement of right   central venous introducer catheter with indwelling [place] catheter which   traverses expected course of terminate in the region of main pulmonary artery. There is been interval improvement in consolidative opacity in the right upper   lung, however there is remain and appear slightly worsened airspace opacities   diffusely throughout both lungs with relative sparing of the left apex. There is   no pleural effusion or pneumothorax. The cardiac silhouette is within normal   limits. The pulmonary vasculature is within normal limits. 01/11/23 1949  XR CHEST PORT Final result    Impression:      1. Limited study. 2.  Support apparatus as above. 3.  Right upper lobe pneumonia.            Narrative:  EXAM:  XR CHEST PORT       INDICATION: placement       COMPARISON: none       TECHNIQUE: Supine portable chest AP view       FINDINGS:        ET tube terminates approximately 4.8 cm above the chuyita. Enteric tube extends   inferiorly off the field-of-view. Cardiomediastinal silhouette is within normal limits. Hazy consolidation   throughout the right upper lobe concerning for pneumonia. Costophrenic sulci are   excluded, limiting evaluation for pleural effusion. No definite pneumothorax. Multidisciplinary Rounds Completed:  Yes      DISPOSITION  Stay in ICU    CRITICAL CARE CONSULTANT NOTE  I had a in-person encounter with Briana Aggarwal, reviewed and interpreted patient data including events, labs, images, vital signs, I/O's, and examined patient. I have discussed the case and the plan and management of the patient's care with the consulting services, the bedside nurses and the respiratory therapist.      NOTE OF PERSONAL INVOLVEMENT IN CARE   This patient is at high risk for sudden and clinically significant deterioration, which requires the highest level of preparedness to intervene urgently. I participated in the decision-making and personally managed or directed the management of the following life and organ supporting interventions that required my frequent assessment to treat or prevent imminent deterioration. I personally spent 55 minutes of critical care time. This is time spent at patient's bedside actively involved in patient care as well as the coordination of care and discussions with the patient's family. This does not include any procedural time which has been billed separately.       ------------------------------------------------------------------------------    Martha Pearson MD, PhD  [address] [phone]

## 2023-01-14 NOTE — PROGRESS NOTES
Date of Consultation:  January 14, 2023    Referring Physician: Ham Dubois MD     Reason for Consultation:  cardiac arrest, anoxic brain injury       History of Present Illness:   Sharmin Abad is a 28 y.o. female with history of anxiety and depression, type 2 diabetes who presents with out of hospital cardiac arrest x2 with witnessed syncopal event at home before being found pulseless by EMS with profound hypotension and hypoxia. The patient was administered thrombolytic therapy. Found to have generalized myoclonus with rapid EEG showing no evidence of increased seizure burden. Neurology was consulted for evaluation of patient's neurological status with concern for anoxic brain injury. Initial labs on admission were white blood cell count of 30.2, platelets 974, sodium 150, potassium 2.9, creatinine 1.93, , , lactic acid 9.3, troponin 995. Initial head CT revealed no acute process. Subsequent head CT revealed loss of gray-white differentiation and cerebral edema. Neurosurgery was consulted and no surgical intervention was recommended. Routine EEG showed generalized slowing of severe nature with low voltages seen diffusely. No seizures. Myoclonus was seen and captured on EEG and unrevealing for seizures. Interval events:  Patient's exam continues to be poor. No further myoclonus seen by nursing. Nursing does share with me that the patient did have decorticate posturing. Past Medical History:   Diagnosis Date    Acute respiratory failure with hypoxia ([address] 1/12/2023    Cardiogenic shock ([address] 1/12/2023    Lactic acidosis 1/12/2023    Myoclonus 1/12/2023    Psychiatric disorder     anxiety, depression    Type 2 diabetes mellitus with hyperglycemia, without long-term current use of insulin ([address] 1/12/2023        No past surgical history on file. No family history on file.      Social History     Tobacco Use    Smoking status: Never    Smokeless tobacco: Not on file   Substance Use Topics    Alcohol use: Not Currently        Allergies   Allergen Reactions    Escitalopram Other (comments)     Muscle pain and heart palpitations    Zoloft [Sertraline] Other (comments)     insomnia        Prior to Admission medications    Not on File       Review of Systems:    General, constitutional: negative  Eyes, vision: negative  Ears, nose, throat: negative  Cardiovascular, heart: negative  Respiratory: negative  Gastrointestinal: negative  Genitourinary: negative  Musculoskeletal: negative  Skin and integumentary: negative  Psychiatric: negative  Endocrine: negative  Neurological: negative, except for HPI  Hematologic/lymphatic: negative  Allergy/immunology: negative    []Unable to obtain  ROS due to  []mental status change  []sedated   [x]intubated      PHYSICAL EXAMINATION:   Visit Vitals  /82   Pulse 91   Temp 96.8 °F (36 °C)   Resp 26   Ht 5' 7\" (1.702 m)   Wt 185 lb (83.9 kg)   SpO2 95%   BMI 28.98 kg/m²     Neurological examination  No sedation since 2300 1/13/23    Lungs: intubated on MV     Language: coma    Cranial nerves:   Pupils fixed and dilated 4mm bilaterally, no reactivity to light or accomodation. Facial sensation/motor:  no grimacing  Corneal reflex absent bilaterally   Oculocephalic reflex absent  No cough/gag (performed by nursing)      Motor: Decreased tone throughout  No evidence of fasciculations  No spontaneous limb movement; no myoclonus seen      Sensory:  No withdrawal to painful stim      Reflexes:    Right Left  Biceps  2 2  Triceps  absent  Brachiorad.  Absent  Patella  absent  Achilles absent    Plantar response: Mute bilaterally      Data:     Lab Results   Component Value Date/Time    Hemoglobin A1c 4.8 01/12/2023 04:44 AM    Sodium 158 (H) 01/14/2023 04:34 AM    Potassium 3.1 (L) 01/14/2023 04:34 AM    Chloride 129 (H) 01/14/2023 04:34 AM    Glucose 136 (H) 01/14/2023 04:34 AM    BUN 16 01/14/2023 04:34 AM    Creatinine 1.21 (H) 01/14/2023 04:34 AM    Calcium 8.2 (L) 01/14/2023 04:34 AM    WBC 16.4 (H) 01/14/2023 04:34 AM    HCT 27.1 (L) 01/14/2023 04:34 AM    HGB 9.1 (L) 01/14/2023 04:34 AM    PLATELET 621 (L) 65/49/7947 04:34 AM       Imaging:    No results found for this or any previous visit. Results from [place] encounter on 01/11/23    CT HEAD WO CONT    Narrative  EXAM: CT HEAD WO CONT    INDICATION: Concern for cerebral edema    COMPARISON: 1/11/2023. CONTRAST: None. TECHNIQUE: Unenhanced CT of the head was performed using 5 mm images. Brain and  bone windows were generated. Coronal and sagittal reformats. CT dose reduction  was achieved through use of a standardized protocol tailored for this  examination and automatic exposure control for dose modulation. FINDINGS:  There is suggestion of some loss of gray-white differentiation throughout the  brain with mild diffuse decreased attenuation. . Evaluation is limited by  motion. . There is no significant white matter disease. There is no intracranial  hemorrhage, extra-axial collection, or mass effect. The basilar cisterns are  open. No CT evidence of acute infarct. The bone windows demonstrate no abnormalities. The visualized portions of the  paranasal sinuses and mastoid air cells are clear. Impression  Suggestion of loss of gray-white differentiation and cerebral edema. Evaluation  is limited by motion. IMPRESSION/RECOMMENDATIONS:  Emilie Hood is a 28 y.o. female with out of hospital cardiac arrest.  Her neurological examination has mildly worsened with now no pupil reactivity, no evidence of corneal reflex or OCRs. There is no cough/gag. Tone decreased throughout with decorticate posturing seen by nursing. No abnormal/spontaneous movements. Subsequent head CT performed yesterday revealing for loss of gray-white differentiation and cerebral edema. EEG revealing for low voltages with severe generalized slowing and minimal reactivity.      Would recommend being off of sedation completely for a total of 72 hours which will be on 1/16/2023. Cardiac arrest with concern for anoxic brain injury:  -- Continue to correct any metabolic derangements (electrolyte abnormalities, elevated LFTs, elevated creatinine)  --72 hours off of sedation, will recheck neuro exam  -- may reconsider EEG on 1/16/23 for further confirmation for neuroprognostication although given neurological exam findings, prior EEG results and neuroimaging, prognosis for meaningful clinical recovery is poor  -- spoke with family regarding all of the above mentioned, and explained that although we wait until 72 hours completely off sedation, given objective findings on exam and neuroimaging/neurodiagnostic studies, prognosis is poor    Thank you very much for this consultation. I spent 60 minutes providing critical care to this acutely ill inpatient with > 50% of the time counseling and assisting in the coordination of care of the patient on the patient's hospital floor/unit.      Manny Fox,

## 2023-01-15 NOTE — PROGRESS NOTES
0700 Bedside report from CARLOS A, [facility].  3975 Patient turned to right side. 0800 Initial shift assessment. Patient is hypotensive, Levo increased to 6mcg/min.   0900 Patient turned to left side. Mouth care and bae care performed. 1100 Complete bath performed, patient turned to the right. Levo decrease to 5mcg/min. 1200 Patient turned to the left. 1600 Patient turned supine. 1800 Patient turned to the right. Potassium results came back at 2.9. 20 mEq given.   1900 Bedside report with Hugo Douglas

## 2023-01-15 NOTE — PROGRESS NOTES
1900 Bedside shift report received from [facility], RN.    2000 assessment completed, refer to the complex assessment flow sheet. Spoke with Nichole WOLF regarding pt Q 4 hour blood sugar checks and trending Na and K. Orders received. 2132 Lab called to report critical results for Sodium of 172 and Phosphorus of  0.9, NP updated on results, orders received. 0000 reassessment completed, no significant changes from the previous assessment. 0151 lab called to report critical results for sodium of 174 NP updated on results, orders received.     0200 am labs drawn from left hand via venous butterfly stick    0301 1 mcg desmopressin given IV push    0400 reassessment completed, changes charted by exception in the complex assessment flow sheet. 0430 verbal orders received for BMP with Mag and Phos. 3685 lab called to report critical Na 170, updated NP orders received. 0618 1 mcg desmopressin given IV push    Bedside shift report given to [facility], RN.

## 2023-01-15 NOTE — PROGRESS NOTES
Pharmacy Antimicrobial Kinetic Dosing    Indication for Antimicrobials: sepsis     Current Regimen of Each Antimicrobial:  Vancomycin 750 mg IV Q12H (Start Date ; Day 4)  Zosyn 3.375 g IV Q8H (Start Date ; Day 4)    Previous Antimicrobial Therapy:    Goal Level: AUC: 400-600 mg/hr/Liter/day    Date Dose & Interval Measured (mcg/mL) Predicted AUC/ALVA    750 mg IV Q12H 15.8 499   1/15 750 mg IV q12h  10.9 366           Date & time of next level: --    Dosing calculator used: [employer] calculator    Significant Positive Cultures:   NA    Conditions for Dosing Consideration: None    Labs:  Recent Labs     01/15/23  0811 01/15/23  0439 01/15/23  0200 23  1026 23  0244   CREA 1.15* 1.23* 1.30*   < > 1.53*   BUN 8 10 10   < > 24*   PCT  --   --  7.28  --  36.81    < > = values in this interval not displayed. Recent Labs     01/15/23  0200 23  0434 23  0244   WBC 10.2 16.4* 25.7*     Temp (24hrs), Av.2 °F (36.8 °C), Min:97.9 °F (36.6 °C), Max:98.5 °F (36.9 °C)    Creatinine Clearance (mL/min):   CrCl (Adjusted Body Weight): 81.1 mL/min   If actual weight < IBW: CrCl (Actual Body Weight) 96.5    Impression/Plan:   SBC, PCT, and SCr continue to trend down, with a subtherapeutic Vancomycin AUC. Increase Vancomycin to 1 gm IV q12h for an estimated AUC of 485  Continue Zosyn same dose  BMP daily, recheck Vancomycin trough in 2-3 days  Antimicrobial stop date TBD     Pharmacy will follow daily and adjust medications as appropriate for renal function and/or serum levels.     Thank you,  Nima Krishna, PHARMD

## 2023-01-15 NOTE — PROGRESS NOTES
ICU DAILY PROGRESS NOTE      Summary  28year old female with history of anxiety/depression admitted (1/11) s/p unclear etiology of PEA arrest with ROSC. History per chart review and EMS: syncopal episode earlier in the day, (no LOC/trauma), reported to feel like Arta Schilder was going to die\" currently on menstrual cycle, (previously noted to be on birth control, unclear if still taking) with generalized weakness and decreased appetite. Pthad cardiac arrest x2 with ROSC. She was clinically treated for PE receiving thrombolytics in ED with oxygenation and hemodynamic improvement about an hour after thrombolysis. However, no evidence of PE found on CT chest.    During her resuscitaiton a swan was planced and she had low CI that improved with inotropes and pressors. Etiology of cardiogenic shock unclear. Family Meeting:  Provided pt's mother update at bedside. Informed that that pt had CT head overnight showing cerebral edema. Prognosis remains poor. We discussed that we are providing clinical support but that the current Neuro exam and EEG suggesting anoxic brain injury. Family is optimistic and want to give more time and continue current level of care. Hospital Course    1/14: Off sedation. Remains unresponsive. 1/15: Patient remains off sedation, unresponsive, no spontaneous respiratory effort.     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 No evidence of seizures. Myoclonus appears to be consistent with anoxic brain injury. EEG also consistent with anoxic brain injury. - continue normo-thermia  - glycemic control  - repeat imaging overnight showed cerebral edema  - clinical finding and imaging suggest poor prognosis. - No brain stem reflexes now, discussed with family, will do cerebral perfusion scan. CVS    Cardiac arrest- PEA  Cardiogenic shock  - etiology unclear  - SVO2- 74 and  CI improved to 4.1; bedside echo from today appears to show normal contractility, will follow up final report. - continue inotropes and pressors weaned off  - maintain MAP > 65    Pulm    Acute respiratory failure    - initially PE suspected which based on history and clinical picture appeared likely. CT chest showed no evidence of PE. Considering that she has below the knee dvt; cardiac function appears to have recovered based on swan numbers and weaning off of inotropes and pressors. It is possible pt ahd transient PE dissolved by the thrombolytics. .   - continue vent support. GI  - GI prophylaxis with protonix  - continue tube feeds. Renal    RHODA  Hypernatremia  Central DI. Hypokalemia  -Continue D5W. -[place]ly desmopressin. -Monitor UO. -Serial BMP.     Endo    Hyperglycemia  BHCG negative    - started on insulin drip for tighter glycemic control  - has menstrual cycle; unclear if still on birth control  - HgBA1C pending    ID    RUL consolidation/hemorrhage  Leukocytosis  - continue abx    Heme  - s/p thrombolytics  - trend H/H    Vitals  Visit Vitals  /87   Pulse 97   Temp 97.9 °F (36.6 °C)   Resp 26   Ht 5' 7\" (1.702 m)   Wt 83.9 kg (185 lb)   SpO2 98%   BMI 28.98 kg/m²      O2 Device: Endotracheal tube Temp (24hrs), Av.2 °F (36.8 °C), Min:97.9 °F (36.6 °C), Max:98.5 °F (36.9 °C)    CVP (mmHg): 12 mmHg (01/15/23 1445)      Intake/Output:     Intake/Output Summary (Last 24 hours) at 1/15/2023 1136  Last data filed at 1/15/2023 1400  Gross per 24 hour   Intake 8989.98 ml   Output 5040 ml   Net 3949.98 ml           Physical exam:  Physical Exam  HENT:      Head: Normocephalic. Eyes:      Pupils: Pupils are equal, round, and reactive to light. Cardiovascular:      Rate and Rhythm: Regular rhythm. Heart sounds: Normal heart sounds. Pulmonary:      Effort: Pulmonary effort is normal.      Breath sounds: Normal breath sounds. Abdominal:      Palpations: Abdomen is soft. Skin:     General: Skin is warm and dry. Neurological:      Comments: Absent brain reflexes. I have examined the patient on this day 1/15/2023 and the above documented exam is accurate including the components that have been copied forward    LABS AND  DATA: Personally reviewed  Recent Labs     01/15/23  0200 01/14/23  0434   WBC 10.2 16.4*   HGB 8.8* 9.1*   HCT 26.4* 27.1*   * 101*       Recent Labs     01/15/23  0811 01/15/23  0439 01/15/23  0200   * 170* 175*   K 3.2* 3.6 3.5   * 142* 146*   CO2 20* 22 21   BUN 8 10 10   CREA 1.15* 1.23* 1.30*   * 157* 154*   CA 7.5* 8.0* 8.0*   MG  --  2.2 2.2   PHOS  --  3.0 2.5*       Recent Labs     01/15/23  0200 01/14/23  0434   AP 84 87   TP 5.4* 5.3*   ALB 2.4* 2.5*   GLOB 3.0 2.8       No results for input(s): INR, PTP, APTT, INREXT, INREXT in the last 72 hours. No results for input(s): PHI, PCO2I, PO2I, FIO2I in the last 72 hours.     Recent Labs     01/13/23  0244   CPK 8,708*         Hemodynamics:   PAP: PAP Systolic: 24 (99/54/69 6610) CO: CO (l/min): 7.9 l/min (01/15/23 1145)   Wedge: PAOP (PCWP) (mmhg): 25 mmHg (01/15/23 1145) CI: CI (l/min/m2): 4.1 l/min/m2 (01/15/23 1145)   CVP:  CVP (mmHg): 12 mmHg (01/15/23 1445) SVR:       PVR:       Ventilator Settings:  Mode Rate Tidal Volume Pressure FiO2 PEEP   Assist control   350 ml    30 % 8 cm H20     Peak airway pressure: 22 cm H2O    Minute ventilation: 9.66 l/min        MEDS: Reviewed    Chest X-Ray:  CXR Results  (Last 50 hours)      None                  Multidisciplinary Rounds Completed:  Yes      DISPOSITION  Stay in ICU    CRITICAL CARE CONSULTANT NOTE  I had a in-person encounter with Chet Bowden, reviewed and interpreted patient data including events, labs, images, vital signs, I/O's, and examined patient. I have discussed the case and the plan and management of the patient's care with the consulting services, the bedside nurses and the respiratory therapist.      NOTE OF PERSONAL INVOLVEMENT IN CARE   This patient is at high risk for sudden and clinically significant deterioration, which requires the highest level of preparedness to intervene urgently. I participated in the decision-making and personally managed or directed the management of the following life and organ supporting interventions that required my frequent assessment to treat or prevent imminent deterioration. I personally spent 40 minutes of critical care time. This is time spent at patient's bedside actively involved in patient care as well as the coordination of care and discussions with the patient's family. This does not include any procedural time which has been billed separately.       ------------------------------------------------------------------------------    Leisa Miller, [address] [facility]  [phone]

## 2023-01-16 NOTE — PROGRESS NOTES
ADULT BRAIN DEATH PRONOUNCEMENT NOTE    Patient's Name: Santa Jon   Patient's YOB: 1995  MRN Number: [id number]    Admitting Provider: No admitting provider for patient encounter. Attending Provider: Sruthi Luque MD    Number Examinations:  One examination by a physician, no specified period of time after neurological insult    Prerequisites Guidelines  Irreversible and identifiable cause of coma - including but not limited to, Traumatic Brain Injury and Anoxic Brain Injury     Blood Alcohol Level (BAL) less than 0.08%    Electrolyte levels or acid-base balance normal, intentionally abnormal, or irreversible abnormal because of known disease process     Sedatives, Analgesics, and Other Central Nervous System Depressant Drugs - must be absent and 5 times the drugs half-life from last use (assuming normal hepatic and renal function, use of hypothermia may delay drug metabolism)    Neuromuscular blockade (paralytics) - not used or off for sufficient period of time to be out of system and excluded as contributing factor. If neuromuscular blockade discontinued, test maximal ulnar nerve stimulation and need to have train of four out of four twitches before proceeding with exam.    Metabolic intoxication - excluded as contributing factor based on history or laboratory examinations    No spontaneous respiratory effort while on ventilator    Core body temperature is 36oC or greater     Systolic Blood Pressure (SBP) equal or greater than 100 mm Hg. If unable to meet SBP parameters with vasopressors, will need to perform ancillary tests after performing Neurological Exam and Apnea Exam, which indicates brain death.     Neurological Exam - all brain stem reflexes MUST be ABSENT to meet criteria for brain stem death    Apnea Exam -   Need to perform only one Apnea exam    Pre-ABG PaCO2 should be eucapnic (35 to 45 mmHg) or at patient's baseline PaCO2    Must have no respiratory effort observed during apnea exam    Post-ABG demonstrates a PaCO2 rises by at least 20 mm Hg above baseline OR is greater than 60 mm Hg (if patient does not have history of CO2 retention)    Terminate Apnea Exam if meets positive criteria or the patient becomes hypoxic (SpO2 less than 85%) or unstable (SBP less than 90 mm Hg)    If patient does not tolerate Apnea Exam then will need to proceed with Ancillary Test    Ancillary Tests - NOT REQUIRED UNLESS ONE OR MORE OF THE FOLLOWING CRITERIA PRESENT:     Neurologic and/or Apnea Examinations attempted but patient could not tolerate examination(s)    Any uncertainty about the Neurologic and/or Apnea Examinations    Concern regarding potential confounding factors (such as pre-existing neuromuscular disease or diaphragm paralysis)     Results of Neurological Exam   Date/Time of Exam Date: 1/16/2023  Time: 11 am   Prerequisites:    Cause of Coma Anoxic Brain Injury   BAL Not performed   Sedatives, Analgesics, CNS Depressants  - Has not been on any CNS depressant medications   Neuromuscular blockade absent   Metabolic intoxication absent   Spontaneous respiratory effort while on ventilator absent   Core body temperature 98.2      Neurological Exam:      Response to pain stimuli sternal rub or supraorbital pressure absent     Yawning absent   Posturing absent   Pupillary reaction with no local eye trauma or mydriatics absent   Corneal reflex absent   Gag reflex absent   Cough reflex (when suctioning trach tube) absent   Oculovestibular reflex absent   Oculocephalic reflex (Doll's eyes) absent   Provider completing Exam: Electronically Signed by: Eddy Osgood, MD        Apnea Test  Not performed due to electrolyte abnormalities   Provider completing Exam: Electronically Signed by: Eddy Osgood, MD    Ancillary tests:  - Nuclear Brain Scan: Date: 01/16/2023; Results: No evidence of cerebral perfusion    Signature and Pronouncement of Brain Death   I certify that neurologic exam and nuclear brain scan confirms irreversible cessation of function of the brain and brainstem.   The patient is declared brain dead on 1/16/23 at 11:21 am  Electronically signed by: Byron Baca MD  Examiner's Specialty: critical care

## 2023-01-16 NOTE — PROGRESS NOTES
Date of Consultation:  January 16, 2023    Referring Physician: Kane Shultz MD     Reason for Consultation:  brain death, neuroprognostication     No chief complaint on file. History of Present Illness:   Yariel Salamanca is a 28 y.o. female with history of anxiety and depression, type 2 diabetes who presents with out of hospital cardiac arrest x2 with witnessed syncopal event at home before being found pulseless by EMS with profound hypotension and hypoxia. The patient was administered thrombolytic therapy. Found to have generalized myoclonus with rapid EEG showing no evidence of increased seizure burden. Neurology was consulted for evaluation of patient's neurological status with concern for anoxic brain injury. Initial labs on admission were white blood cell count of 30.2, platelets 792, sodium 150, potassium 2.9, creatinine 1.93, , , lactic acid 9.3, troponin 995. Initial head CT revealed no acute process. Subsequent head CT revealed loss of gray-white differentiation and cerebral edema. Neurosurgery was consulted and no surgical intervention was recommended. Routine EEG showed generalized slowing of severe nature with low voltages seen diffusely. No seizures. Myoclonus was seen and captured on EEG and unrevealing for seizures. Interval events:  No significant changes. Patient will go for NM perfusion scan this morning. Past Medical History:   Diagnosis Date    Acute respiratory failure with hypoxia ([address] 1/12/2023    Cardiogenic shock ([address] 1/12/2023    Lactic acidosis 1/12/2023    Myoclonus 1/12/2023    Psychiatric disorder     anxiety, depression    Type 2 diabetes mellitus with hyperglycemia, without long-term current use of insulin ([address] 1/12/2023        No past surgical history on file. No family history on file.      Social History     Tobacco Use    Smoking status: Never    Smokeless tobacco: Not on file   Substance Use Topics    Alcohol use: Not Currently Allergies   Allergen Reactions    Escitalopram Other (comments)     Muscle pain and heart palpitations    Zoloft [Sertraline] Other (comments)     insomnia        Prior to Admission medications    Not on File       Review of Systems:    General, constitutional: negative  Eyes, vision: negative  Ears, nose, throat: negative  Cardiovascular, heart: negative  Respiratory: negative  Gastrointestinal: negative  Genitourinary: negative  Musculoskeletal: negative  Skin and integumentary: negative  Psychiatric: negative  Endocrine: negative  Neurological: negative, except for HPI  Hematologic/lymphatic: negative  Allergy/immunology: negative    []Unable to obtain  ROS due to  [x]mental status change  []sedated   [x]intubated      PHYSICAL EXAMINATION:   Visit Vitals  BP (!) 90/53   Pulse 87   Temp 97.9 °F (36.6 °C)   Resp 26   Ht 5' 7\" (1.702 m)   Wt 185 lb (83.9 kg)   SpO2 98%   BMI 28.98 kg/m²   Neurological examination  No sedation since 2300 1/13/23     Lungs: intubated on MV      Language: coma     Cranial nerves:   Pupils fixed and dilated 4mm bilaterally, no reactivity to light or accomodation. Facial sensation/motor:  no grimacing  Corneal reflex absent bilaterally   Oculocephalic reflex absent  No cough/gag (performed by nursing)        Motor: Decreased tone throughout  No evidence of fasciculations  No spontaneous limb movement; no myoclonus seen        Sensory:  Has triple flexion reflex in the left lower extremity - as well as some flexion of the toes when checking for babinski. Right upper extremity limb pronation extension reflex. Reflexes:                          Right    Left  Biceps             2          2  Triceps            absent  Brachiorad.       Absent  Patella             absent  Achilles            absent     Plantar response: Mute bilaterally    Data:     Lab Results   Component Value Date/Time    Hemoglobin A1c 4.8 01/12/2023 04:44 AM    Sodium 144 01/16/2023 08:32 AM Potassium 3.6 01/16/2023 08:32 AM    Chloride 115 (H) 01/16/2023 08:32 AM    Glucose 104 (H) 01/16/2023 08:32 AM    BUN 2 (L) 01/16/2023 08:32 AM    Creatinine 0.89 01/16/2023 08:32 AM    Calcium 7.1 (L) 01/16/2023 08:32 AM    WBC 11.9 (H) 01/16/2023 03:05 AM    HCT 23.6 (L) 01/16/2023 03:05 AM    HGB 7.7 (L) 01/16/2023 03:05 AM    PLATELET 95 (L) 39/13/7145 03:05 AM       Imaging:    No results found for this or any previous visit. Results from [place] encounter on 01/11/23    CT HEAD WO CONT    Narrative  EXAM: CT HEAD WO CONT    INDICATION: Concern for cerebral edema    COMPARISON: 1/11/2023. CONTRAST: None. TECHNIQUE: Unenhanced CT of the head was performed using 5 mm images. Brain and  bone windows were generated. Coronal and sagittal reformats. CT dose reduction  was achieved through use of a standardized protocol tailored for this  examination and automatic exposure control for dose modulation. FINDINGS:  There is suggestion of some loss of gray-white differentiation throughout the  brain with mild diffuse decreased attenuation. . Evaluation is limited by  motion. . There is no significant white matter disease. There is no intracranial  hemorrhage, extra-axial collection, or mass effect. The basilar cisterns are  open. No CT evidence of acute infarct. The bone windows demonstrate no abnormalities. The visualized portions of the  paranasal sinuses and mastoid air cells are clear. Impression  Suggestion of loss of gray-white differentiation and cerebral edema. Evaluation  is limited by motion. IMPRESSION/RECOMMENDATIONS:  Mariella Newell is a 28 y.o. female with out of hospital cardiac arrest.  Her neurological examination has mildly worsened with now no pupil reactivity, no evidence of corneal reflex or OCRs. There is no cough/gag. Tone decreased throughout with decorticate posturing seen by nursing. No abnormal/spontaneous movements.  Subsequent head CT performed yesterday revealing for loss of gray-white differentiation and cerebral edema. EEG revealing for low voltages with severe generalized slowing and minimal reactivity. Would recommend being off of sedation completely for a total of 72 hours which will be on 1/16/2023. Exam on 1/16 shows some movements to painful stimulation in the left lower extremity which is most suggestive of triple flexion, which is a spinal cord movement and may occur with brain death. Movements do not appear to be completely purposeful or cortically-originated, however cannot completely rule out and therefore would recommend further ancillary testing with NM perfusion scan, MRI brain and/or EEG. No brainstem reflexes with 72 hours post-sedation shows a very poor prognosis overall for any meaningful function. Cardiac arrest with concern for anoxic brain injury:  -- Continue to correct any metabolic derangements (electrolyte abnormalities, elevated LFTs, elevated creatinine)  -- may reconsider EEG for further ancillary diagnostic testing for neuroprognostication although given neurological exam findings, prior EEG results and neuroimaging, prognosis for meaningful clinical recovery is poor given absent brainstem response   -- agree with NM perfusion scan to help confirm brain death  --for further ancillary testing support can consider brain MRI without contrast   -- spoke with family 1/16/23 with updates that prognosis is poor and guarded at this time     Thank you very much for this consultation. I spent 60 minutes providing critical care to this acutely ill inpatient with > 50% of the time counseling and assisting in the coordination of care of the patient on the patient's hospital floor/unit.       Oleg Garza DO

## 2023-01-16 NOTE — PROGRESS NOTES
ICU DAILY PROGRESS NOTE      Summary  28year old female with history of anxiety/depression admitted (1/11) s/p unclear etiology of PEA arrest with ROSC. History per chart review and EMS: syncopal episode earlier in the day, (no LOC/trauma), reported to feel like Hiren Rojo was going to die\" currently on menstrual cycle, (previously noted to be on birth control, unclear if still taking) with generalized weakness and decreased appetite. Pthad cardiac arrest x2 with ROSC. She was clinically treated for PE receiving thrombolytics in ED with oxygenation and hemodynamic improvement about an hour after thrombolysis. However, no evidence of PE found on CT chest.    During her resuscitaiton a swan was planced and she had low CI that improved with inotropes and pressors. Etiology of cardiogenic shock unclear. Family Meeting:  Provided pt's mother update at bedside. Informed that that pt had CT head overnight showing cerebral edema. Prognosis remains poor. We discussed that we are providing clinical support but that the current Neuro exam and EEG suggesting anoxic brain injury. Family is optimistic and want to give more time and continue current level of care. Hospital Course    1/14: Off sedation. Remains unresponsive. 1/15: Patient remains off sedation, unresponsive, no spontaneous respiratory effort. 1/16: Remains unresponsive off sedation.      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 No evidence of seizures. Myoclonus appears to be consistent with anoxic brain injury. EEG also consistent with anoxic brain injury. - continue normo-thermia  - glycemic control  - repeat imaging overnight showed cerebral edema  - clinical finding and imaging suggest poor prognosis. - No brain stem reflexes now, discussed with family, will do cerebral perfusion scan. CVS    Cardiac arrest- PEA  Cardiogenic shock  - etiology unclear  - SVO2- 74 and  CI improved to 4.1; bedside echo from today appears to show normal contractility, will follow up final report. - continue inotropes and pressors weaned off  - maintain MAP > 65    Pulm    Acute respiratory failure    - initially PE suspected which based on history and clinical picture appeared likely. CT chest showed no evidence of PE. Considering that she has below the knee dvt; cardiac function appears to have recovered based on swan numbers and weaning off of inotropes and pressors. It is possible pt ahd transient PE dissolved by the thrombolytics. .   - continue vent support. GI  - GI prophylaxis with protonix  - continue tube feeds. Renal    RHODA  Hypernatremia  Central DI. Hypokalemia  -Continue D5W. -Ely desmopressin. -Monitor UO. -Serial BMP. Endo    Hyperglycemia  BHCG negative    - started on insulin drip for tighter glycemic control  - has menstrual cycle; unclear if still on birth control  - HgBA1C pending    ID    RUL consolidation/hemorrhage  Leukocytosis  - continue abx    Heme  - s/p thrombolytics  - worsening anemia, monitor H/H.  - Holding therapeutic AC due to anemia but continue prophylactic anticoagulation.     Vitals  Visit Vitals  BP (!) 90/53   Pulse 87   Temp 97.9 °F (36.6 °C)   Resp 26   Ht 5' 7\" (1.702 m)   Wt 83.9 kg (185 lb)   SpO2 98%   BMI 28.98 kg/m²      O2 Device: Endotracheal tube, Ventilator Temp (24hrs), Av.1 °F (36.7 °C), Min:97.4 °F (36.3 °C), Max:98.7 °F (37.1 °C)    CVP (mmHg): 12 mmHg (01/16/23 1245)      Intake/Output:     Intake/Output Summary (Last 24 hours) at 1/16/2023 1406  Last data filed at 1/16/2023 1302  Gross per 24 hour   Intake 9288.22 ml   Output 3150 ml   Net 6138.22 ml           Physical exam:  Physical Exam  HENT:      Head: Normocephalic. Eyes:      Pupils: Pupils are equal, round, and reactive to light. Cardiovascular:      Rate and Rhythm: Regular rhythm. Heart sounds: Normal heart sounds. Pulmonary:      Effort: Pulmonary effort is normal.      Breath sounds: Normal breath sounds. Abdominal:      Palpations: Abdomen is soft. Skin:     General: Skin is warm and dry. Neurological:      Comments: Absent brain reflexes. I have examined the patient on this day 1/16/2023 and the above documented exam is accurate including the components that have been copied forward    LABS AND  DATA: Personally reviewed  Recent Labs     01/16/23  0305 01/15/23  0200   WBC 11.9* 10.2   HGB 7.7* 8.8*   HCT 23.6* 26.4*   PLT 95* 109*       Recent Labs     01/16/23  1218 01/16/23  0832 01/16/23  0305 01/15/23  0811 01/15/23  0439   * 144 146*   < > 170*   K 3.6 3.6 3.4*   < > 3.6   * 115* 116*   < > 142*   CO2 21 22 22   < > 22   BUN 2* 2* 3*   < > 10   CREA 0.97 0.89 0.93   < > 1.23*   * 104* 125*   < > 157*   CA 7.2* 7.1* 6.4*   < > 8.0*   MG  --   --  1.1*  --  2.2   PHOS  --   --  4.1  --  3.0    < > = values in this interval not displayed. Recent Labs     01/16/23  0305 01/15/23  0200   AP 79 84   TP 4.8* 5.4*   ALB 1.9* 2.4*   GLOB 2.9 3.0       No results for input(s): INR, PTP, APTT, INREXT, INREXT in the last 72 hours. No results for input(s): PHI, PCO2I, PO2I, FIO2I in the last 72 hours. No results for input(s): CPK, CKMB, TROIQ, BNPP in the last 72 hours.       Hemodynamics:   PAP: PAP Systolic: 24 (14/02/65 4646) CO: CO (l/min): 6.6 l/min (01/16/23 0700)   Wedge: PAOP (PCWP) (mmhg): 23 mmHg (01/15/23 1615) CI: CI (l/min/m2): 3.4 l/min/m2 (01/16/23 0700)   CVP:  CVP (mmHg): 12 mmHg (01/16/23 1245) SVR:       PVR:       Ventilator Settings:  Mode Rate Tidal Volume Pressure FiO2 PEEP   Assist control   350 ml    30 % 8 cm H20     Peak airway pressure: 24 cm H2O    Minute ventilation: 9.5 l/min        MEDS: Reviewed    Chest X-Ray:  CXR Results  (Last 48 hours)      None                  Multidisciplinary Rounds Completed:  Yes      DISPOSITION  Stay in ICU    CRITICAL CARE CONSULTANT NOTE  I had a in-person encounter with Annamaria Kasper, reviewed and interpreted patient data including events, labs, images, vital signs, I/O's, and examined patient. I have discussed the case and the plan and management of the patient's care with the consulting services, the bedside nurses and the respiratory therapist.      NOTE OF PERSONAL INVOLVEMENT IN CARE   This patient is at high risk for sudden and clinically significant deterioration, which requires the highest level of preparedness to intervene urgently. I participated in the decision-making and personally managed or directed the management of the following life and organ supporting interventions that required my frequent assessment to treat or prevent imminent deterioration. I personally spent 40 minutes of critical care time. This is time spent at patient's bedside actively involved in patient care as well as the coordination of care and discussions with the patient's family. This does not include any procedural time which has been billed separately.       ------------------------------------------------------------------------------    Arbutus Simmonds, [address] [facility]  [phone]

## 2023-01-16 NOTE — PROGRESS NOTES
Bedside shift report received from [facility], RN    2000 assessment completed, refer to the complex assessment flow sheet. 0000 reassessment completed, no significant changes from the previous assessment. 0400 reassessment completed, no significant changes from the previous assessment. 1214 called lab to check on chemistry drawn at 0305 and received at 0319 showing in process.  unable to locate sample at this time,  will return call once located.     1 Tech returned call to report a critical Calcium of 6.4, updated NP, orders received    0620 spoke with [employer], they are to be here around 0800 for the cerebral perfusion scan    0700 Bedside shift report given to Enriqueta Alcantara

## 2023-01-16 NOTE — DISCHARGE SUMMARY
Death Discharge Summary            Patient ID:  Adan Bolden y.o.  1995  [id number]    Admit Date: 2023    Attending Physician:  Augustus Segura MD    Chief Complaint:  No chief complaint on file. Problem List:    Patient Active Problem List    Diagnosis Date Noted    Goals of care, counseling/discussion 2023    Cerebral edema ([facility] 2023    Palliative care by specialist 2023    Cardiogenic shock ([facility] 2023    Acute respiratory failure with hypoxia ([facility] 2023    Lactic acidosis 2023    Type 2 diabetes mellitus with hyperglycemia, without long-term current use of insulin [facility] 2023    Myoclonus 2023    Cardiac arrest ([facility] 2023    Heart disease 2023       Death Diagnosis:    Sudden cardiac death. Hospital Course:    Ms. Dacia Ramsay was admitted on  after she presented with cardiac arrest. She was given TPA empirically during the code for suspected PE. She did achieve ROSC eventually in ED. Supportive care was continued. Repeat CT head and EEG was consistent with anoxic brain injury. Patient then developed central DI with hypernatremia. She lost al brain stem reflexes. Cerebral perfusion scan was done which was consistent with brain death. EEG was also done which was consistent with severe anoxic brain injury.  Based on cerebral perfusion scan consistent with brain death, patient was pronounced dead at 11:21 am        Condition at discharge:         Francisco Cornell MD  [address]

## 2023-01-16 NOTE — PROGRESS NOTES
0730: Bedside report received from [facility]. Pt in bed with no response to any stimuli. VSS. Levo at 3mcg/min. Pt for cerebral perfusion scan today. 1108: Levo to 2mcg/min. 0830: Family to bedside. Pt for NM scan at 56.    0850: Blood to lab.    0925: MD made aware of increase in urinary output, will resume DDAVP. Per MD ok to cap [place]. 1010: IDRs completed. 1037: Levo at 3. Pt transferred off unit to NM.    1127: Pt returned to unit. 1220: Blood to lab. 1435: Family at beside for meting with intensivist regarding perfusion scan results and plan of care. ABG ordered. 1445: EEG ordered. 1458: No answer when calling EEG extensions of 4120/1616. Message left with pager number [phone], obtained from [facility]. 1500: EEG tech to bedside. ABG to lab.    1620: EEG completed. DDAVP order placed. D5 at 125mL/hr.    1630: MD made aware of ABG results. Rate lowered to 18. Recheck ABG at 1700. Blood to lab. 1938: Bedside report given to Rashad Peng RN.

## 2023-01-16 NOTE — PROCEDURES
EEG REPORT    Patient Name: Emilie Hood  : 1995  Age: 28 y.o. Ordering physician: No ref. provider found  Date of EEG start time: 2023 at 3:15PM  Date of EEG end time: 2023 at 4:06 PM   EEG procedure number: [id number]  Diagnosis: anoxic cerebral injury, cardiac arrest   Interpreting physician: Emmett Karimi FAAN    Procedure: EEG    CLINICAL INDICATION: The patient is a 28 y.o. female who is being evaluated for baseline electro cerebral activities and to rule out seizure focus.       Current Facility-Administered Medications   Medication Dose Route Frequency    insulin lispro (HUMALOG) injection   SubCUTAneous Q6H    desmopressin (DDAVP) 4 mcg/mL injection 1 mcg  1 mcg IntraVENous TID    vancomycin (VANCOCIN) 1,000 mg in 0.9% sodium chloride 250 mL ([id number])  1,000 mg IntraVENous Q12H    balsam peru-castor oiL (VENELEX) ointment   Topical BID    dextrose 5% infusion  125 mL/hr IntraVENous CONTINUOUS    phosphorus (K PHOS NEUTRAL) 250 mg tablet 2 Tablet  2 Tablet Per NG tube BID    enoxaparin (LOVENOX) injection 40 mg  40 mg SubCUTAneous Q24H    alcohol 62% (NOZIN) nasal  1 Ampule  1 Ampule Topical Q12H    NOREPINephrine (LEVOPHED) 8 mg in 5% dextrose 250mL (32 mcg/mL) infusion  0.5-30 mcg/min IntraVENous TITRATE    acetaminophen (TYLENOL) tablet 650 mg  650 mg Oral Q4H PRN    Or    acetaminophen (TYLENOL) suppository 650 mg  650 mg Rectal Q4H PRN    glucose chewable tablet 16 g  4 Tablet Oral PRN    glucagon (GLUCAGEN) injection 1 mg  1 mg IntraMUSCular PRN    dextrose 10% infusion 0-250 mL  0-250 mL IntraVENous PRN    pantoprazole (PROTONIX) 40 mg in 0.9% sodium chloride 10 mL injection  40 mg IntraVENous Q12H    chlorhexidine (ORAL CARE KIT) 0.12 % mouthwash 15 mL  15 mL Oral Q12H    piperacillin-tazobactam (ZOSYN) 3.375 g in 0.9% sodium chloride (MBP/ADV) 100 mL MBP  3.375 g IntraVENous Q8H           DESCRIPTION OF PROCEDURE:   This is a digitally recorded electroencephalogram.  Electrodes were applied in accordance with the international 10-20 system of electrode placement. 18 channels of scalp EEG are recorded. A channel was used for EoG. Another channel was used for ECG. The data is stored digitally and reviewed in reformatted montages for optimal display. EEG was reviewed in both bipolar and referential montages. This recording was performed at the sensitivity of 2 µV with electrodes placed 10 cm apart with sensitivity (less than 5 Ohms) high-frequency filter set at 30 with low-frequency filter set up 1. Description of Activity: The background of this recording contains essentially suppressed activity with no measurable voltage seen diffusely. There is the presence of EKG artifact as well as some very low amplitude high frequency (beta) artifact seen diffusely which is likely from electrical noise or shivering artifact. There is no posterior dominant rhythm or frequency captured that is measurable. Throughout the recording, there were no clear areas of focal slowing nor spike or spike-and-wave discharges seen. Hyperventilation was not performed. Photic stimulation produced no response in the posterior head regions. Painful stimulation with finger pinch, did not produce any cortical change in the EEG. Clinical Interpretation: This EEG, performed in the ICU is significantly abnormal.  There was no clear cortical activity seen at sensitivity of 2 uV however there is some high frequency low amplitude artifact seen diffusely as well as EKG artifact and therefore cannot completely comment on brain death however given these results, these findings consist of characteristics that are seen in anoxic-cerebral brain injury such as that in cardiac arrest and portends a very poor neurological prognosis.  Please note that EEG should not be used to diagnose brain death, but rather aid in supportive objective findings to assist in neuroprognostication. Clinical correlation recommended with neurological exam and neuroimaging results. Estrella Moulton.

## 2023-01-17 NOTE — Clinical Note
Closed using manual compression and Hemostasis Pad. Site secured by Tegaderm. Pressure held for: 610 minutes.

## 2023-01-17 NOTE — PROCEDURES
[facility]      Procedure Note - Arterial Access:   Performed by Rosalio Anderson NP. Diagnosis: S/p Cardiac Arrest  Insertion Date: 01/16/23   Time: 11:26 PM   Obtained Consent? N/A; informed   Procedure Location:  ICU. Immediately prior to the procedure, the patient was reevaluated and found suitable for the planned procedure and any planned medications. Immediately prior to the procedure a time out was called to verify the correct patient, procedure, equipment, staff, and marking as appropriate. Collateral circulation confirmed with Marissa Dylan test.     Line Bundle:  Full sterile barrier precautions used. 5 mL 1% Lidocaine placed at insertion site. Method: Seldinger technique. Site Prep: ChloraPrep and Sterile draping. Procedure: Arterial Catheter Insertion in Left, Radial Artery   Catheter inserted into a new site. Number of Attempts:  1  Indication: Monitoring and Blood Drawing. There was bright red, pulsatile blood return. Femoral Site? N/A. If Yes, reason femoral site was chosen: N/A  Catheter secured. Biopatch in place? yes. Sterile Bio-occlusive dressing placed. Complication None. The procedure was tolerated well.     Rosalio Anderson NP   Critical Care Medicine  [facility]

## 2023-01-17 NOTE — PROCEDURES
[facility]  Bronchoscopy    Procedure: Diagnostic bronchoscopy. Indication:  Transplant evaluation. Consent/Treatment: Not obtained. Anesthesia:   none    Procedure Details:   -- The bronchoscope was introduced through an endotracheal tube. -- The trachea and chuyita were completely inspected and were found to be normal except friable mucosa and small amount of inspissated secretions which were suctioned clear. -- The right-sided endobronchial anatomy was completely inspected and found to be normal except friable mucosa and bruising in right mainstem bronchus likely from suction trauma. -- The left-sided endobronchial anatomy was completely inspected and found to be normal except friable mucosa. Specimens:   BAL obtained from RUL and MAIKOL and also bronchial washing sent for testing per [employer].       Complications: none    Estimated Blood Loss: Minimal    Gita Haider MD

## 2023-01-17 NOTE — Clinical Note
Left femoral artery. Femoral access needle used. Using ultrasound guidance.  Number of attempts =  1.

## 2023-01-17 NOTE — Clinical Note
Right radial artery. Accessed unsuccessfully. Radial access needle used. Using ultrasound guidance. Number of attempts =  1.  Switching to femoral

## 2023-01-17 NOTE — PROGRESS NOTES
0700- Shift report received from Jered RN Report included the following information SBAR, Kardex, ED Summary, Procedure Summary, Intake/Output, MAR, and Recent Results    0800- Shift assessment complete, pt resting comfortably in bed. OPO at bedside. See MAR and Flowsheets for more details. Q4 ABG drawn. Pt hypothermic at this time; 96.1, felicia hugger applied. 0930- Pt tachycardic (120's) OPO notified. New orders received to decreases Levothyr to 10 mcg. And increase fiO2 to 50%. 1000- Pt remains tachy, new orders noted for 5 mg IV Metoprolol. 1200- Reassessment complete, see MAR and Flowsheets for more details. ABG and noon labs drawn and sent. 1210- . OPO notified, new orders noted for insulin infusion and glucostabilizer. 1300- Urine culture sent to lab per OPO. 0- Dr. Isreal Munoz and Respiratory Therapy at bedside for Bronchoscopy. 1600- Reassessment complete, see Flowsheet. Repeat ABG complete.      1900- Shift report given to Jered RN Report included the following information SBAR, Kardex, ED Summary, Procedure Summary, Intake/Output, MAR, and Recent Results

## 2023-01-18 PROBLEM — Z52.9 ORGAN DONOR: Status: ACTIVE | Noted: 2023-01-01

## 2023-01-18 NOTE — CARDIO/PULMONARY
Cardiac Rehab Note: chart review/referral     Cardiac cath 1/18/22 ordered per organ donation coordinator.

## 2023-01-18 NOTE — PROGRESS NOTES
0700 Bedside report with Lori Davila, 3 Spencer Lopez Cath lab tech and [employer] rep Clint Thomas at bedside. Patient being prep for transport to cath lab.  3317 patient in route to cath lab. Cardiac monitor denotes sinus tach, tolerating vent settings well, vitals stable. 4734 Patient arrives in cath lab and bedside report given to Amauri Garg RN.  0736 Phone call report from Amauri Garg RN  6184 Patient returned to room. No challenges, breath sounds equals. Pulses intact, edema is unchanged. 1010 Gluco stabilizer trends resumed. Patient's BP and I&Os are now hourly.  1 unit PRBCs order for Hg of 6.8, along with 20 mEq of k for a result of 3.7.  1200 Reassessment. Patient turned to right side. 1300 Full bath completed. 1400 turned to left side. 1500 1 unit of PRBC transfusion initiated at 75 ml/hr. Vitals obtained. Observing patient. 1515 Transfusion rate increased to 100 ml/hr. All vitals are stable. 1600 transfusion rate increased to 125 ml/hr. 1750 Transfusion complete. Vitals are slightly elevated but stable. Temp is normal. HR is 119, normal tachy, same as previously assessed. 1800 Patient repositioned on the left side. 1900 Bedside given to [facility] MONSE HAWKINS.

## 2023-01-18 NOTE — ROUTINE PROCESS
TRANSFER - OUT REPORT:    Verbal report given to Cristóbal Hatfield RN(name) on Briana Aggarwal  being transferred to the Cardiac cath lab(unit) for ordered procedure     Report consisted of patients Situation, Background, Assessment and   Recommendations(SBAR). / Bp 157/93  Information from the following report(s) SBAR, Kardex, ED Summary, Procedure Summary, Intake/Output, MAR, Accordion, Recent Results, Med Rec Status, Cardiac Rhythm sinus tachycardia, Alarm Parameters , and Quality Measures was reviewed with the receiving nurse. Opportunity for questions and clarification was provided. Patient transported with:   Monitor  O2 @ 40 liters  Registered Nurse  Tech   The Insulin is maintained at 3.2 unit/hour, Synthroid at 20 mcq/hour  1/2 NS at 50ml/hour, Solu-Medrol is infusing at 100mg/hour, Vasopressin at 0.02 units /min, and a KVO line. Right IJ cordis, Right femoral, Left radial Arterial line is patent. The right 18g is clamped.   /99

## 2023-01-18 NOTE — PROGRESS NOTES
Pharmacy Antimicrobial Kinetic Dosing     Indication for Antimicrobials: sepsis, organ donor     Current Regimen of Each Antimicrobial:  Vancomycin 750 mg IV Q12H (Start Date ; Day 7  Zosyn 3.375 g IV Q8H (Start Date ; Day 7     Previous Antimicrobial Therapy:  N/a     Goal Level: AUC: 400-600 mg/hr/Liter/day     Date Dose & Interval Measured (mcg/mL) Predicted AUC/ALVA    750 mg IV Q12H 15.8 499   1/15 750 mg IV q12h  10.9 366     1000 mg q12h   8.7        Dosing calculator used: Intoo calculator     Significant Cultures:    urine cx NG   sputum cx normal luis daniel   blood cx NGTD pending  1117 Sputum cx (ETT aspirate) NGTD, pending    urine cx pending     Conditions for Dosing Consideration: None      Labs:  Recent Labs     23  0008 23  2044 23  1154   CREA 0.87 0.85 1.00   BUN 11 10 9   WBC 16.5* 15.0* 15.8*     Temp (24hrs), Av.9 °F (36.6 °C), Min:95.7 °F (35.4 °C), Max:99.1 °F (37.3 °C)    Creatinine Clearance (mL/min) or Dialysis: 93.6 mL/min (IBW)     Impression/Plan:   Scr stable  Vancomycin  level below goal range  Increase dose to 1000 mg IV q8h  Anticipated   Antimicrobial stop date TBD      Pharmacy will follow daily and adjust medications as appropriate for renal function and/or serum levels.      Thank you,  ETHEL Clarke

## 2023-01-18 NOTE — PROGRESS NOTES
1930  Bedside and Verbal shift change report given to Chata Schwarz (oncoming nurse) by Elida Ch RN (offgoing nurse). Report included the following information Kardex, Intake/Output, MAR, Recent Results, and Cardiac Rhythm ST . Judi Pimentel RN, [facility] coordinator, at bedside during report and sharing plan of care. COvering coordinator, MONSE Dubose, in unit for handover of care. 500 ml of 0.45% NS started for BP/UOP per protocol. Family and visitors at bedside. 1938  increased PEEP to 20 and Fi02 to 100 %. RT notified. 2044  Labs drawn and sent. [place] Shima 0000  No change in assessment. Bed changed to get ability to do CPT. Complete bath. 0400  No change in assessment. 0630  Per coordinator, patient needs left and right heart cath. She contacted Dr. Patricia Hill via on call service to set up cath. 0730  Bedside and Verbal shift change report given to [address] and Wm. Arash Jaimes RN (oncoming nurse) by Aliyah Hernandez RN (offgoing nurse).  Report included the following information MAR, Recent Results, and Cardiac Rhythm ST .

## 2023-01-18 NOTE — ANESTHESIA PREPROCEDURE EVALUATION
Relevant Problems   CARDIOVASCULAR   (+) Cardiac arrest (HCC)      ENDOCRINE   (+) Type 2 diabetes mellitus with hyperglycemia, without long-term current use of insulin (HCC)       Anesthetic History   No history of anesthetic complications            Review of Systems / Medical History  Patient summary reviewed, nursing notes reviewed and pertinent labs reviewed    Pulmonary                Comments: ?PE    Neuro/Psych         Psychiatric history     Cardiovascular            Dysrhythmias       Exercise tolerance: <4 METS  Comments:   TTE    Left Ventricle: Normal left ventricular systolic function with a visually estimated EF of 60 - 65%. Left ventricle size is normal. Normal wall thickness. Normal wall motion. GI/Hepatic/Renal  Within defined limits              Endo/Other    Diabetes         Other Findings              Physical Exam    Airway          Intubated   Cardiovascular    Rhythm: regular  Rate: abnormal         Dental    Dentition: Upper dentition intact and Lower dentition intact     Pulmonary  Breath sounds clear to auscultation               Abdominal  GI exam deferred       Other Findings            Anesthetic Plan    ASA: 6  Anesthesia type: general    Monitoring Plan: Arterial line, CVP and [place]      Induction: Intravenous    Implied consent, ASA 6 pt.

## 2023-01-19 NOTE — PROGRESS NOTES
[id number]  Povidone iodine 50 ml mixed in 500 ml sterile water and given via OGt  at 2108 as per [employer] Rep. Bipin Tuttle, CXR & ABGs done, patient moved to OR,  TRANSFER - OUT REPORT:    Verbal report given to OR Team(name) on Sharon Pelayo  being transferred to OR(unit) for change in patient condition(for organ [place] Multiple)       Report consisted of patients Situation, Background, Assessment and   Recommendations(SBAR). Information from the following report(s) SBAR, Kardex, Intake/Output, MAR, Med Rec Status, and Cardiac Rhythm ST  was reviewed with the receiving nurse. Lines:   Double Lumen RIGHT IJ DOUBLE LUMEN MAC 01/11/23 Anterior;Right Neck (Active)   Central Line Being Utilized Yes 01/18/23 2000   Criteria for Appropriate Use Hemodynamically unstable, requiring monitoring lines, vasopressors, or volume resuscitation 01/18/23 2000   Site Assessment Clean, dry, & intact 01/18/23 2000   Infiltration Assessment 0 01/18/23 2000   Affected Extremity/Extremities Color distal to insertion site pink (or appropriate for race) 01/18/23 2000   Dressing Status Clean, dry, & intact 01/18/23 2000   Dressing Type Bacteriocidal;Transparent 01/18/23 2000   Action Taken Open ports on tubing capped 01/18/23 1600   Proximal Hub Color/Line Status White; Infusing 01/18/23 2000   Positive Blood Return (Medial Site) Yes 01/18/23 1600   Distal Hub Color/Line Status Brown; Infusing 01/18/23 2000   Positive Blood Return (Lateral Site) Yes 01/18/23 1600   Alcohol Cap Used Yes 01/18/23 2000       Triple Lumen RIGHT GROIN TLC 01/11/23 Proximal;Right Femoral (Active)   Central Line Being Utilized Yes 01/18/23 2000   Criteria for Appropriate Use Hemodynamically unstable, requiring monitoring lines, vasopressors, or volume resuscitation 01/18/23 2000   Site Assessment Clean, dry, & intact 01/18/23 2000   Infiltration Assessment 0 01/18/23 2000   Affected Extremity/Extremities Color distal to insertion site pink (or appropriate for race) 01/18/23 2000   Date of Last Dressing Change 01/14/23 01/18/23 2000   Dressing Status Clean, dry, & intact 01/18/23 2000   Dressing Type Bacteriocidal;Transparent 01/18/23 2000   Action Taken Open ports on tubing capped 01/18/23 1600   Proximal Hub Color/Line Status White; Infusing 01/18/23 2000   Positive Blood Return (Medial Site) Yes 01/18/23 1600   Medial Hub Color/Line Status Blue; Infusing 01/18/23 2000   Positive Blood Return (Lateral Site) Yes 01/18/23 1600   Distal Hub Color/Line Status Brown; Infusing 01/18/23 2000   Positive Blood Return (Site #3) Yes 01/18/23 1600   Alcohol Cap Used Yes 01/18/23 2000       Peripheral IV 01/17/23 Posterior;Right Forearm (Active)   Site Assessment Clean, dry, & intact 01/18/23 2000   Phlebitis Assessment 0 01/18/23 2000   Infiltration Assessment 0 01/18/23 2000   Dressing Status Clean, dry, & intact 01/18/23 2000   Dressing Type Transparent;Tape 01/18/23 2000   Hub Color/Line Status Pink; Infusing 01/18/23 2000   Action Taken Open ports on tubing capped 01/18/23 1600   Alcohol Cap Used Yes 01/18/23 2000       Arterial Line 01/16/23 Left Radial artery (Active)   Site Assessment Clean, dry, & intact 01/18/23 2000   Dressing Status Clean, dry, & intact 01/18/23 2000   Dressing Type Disk with Chlorhexadine gluconate (CHG); Transparent 01/18/23 2000   Line Status Intact and in place 01/18/23 2000   Treatment Arm board off 01/18/23 2000   Affected Extremity/Extremities Color distal to insertion site pink (or appropriate for race); Range of motion performed 01/18/23 2000        Opportunity for questions and clarification was provided. Patient transported with:   On Ventilator,  Monitor,  RN             01/18/23 2000   Assessment  Type   Assessment Type Shift assessment   Psychosocial   Psychosocial (WDL) X   Purposeful Interaction No (comment)   Caritas Process Nurture loving kindness;Enable wilian/hope;Establish trust;Nurture spiritual self; Attend basic human needs   Caring Interventions Reassure; Therapeutic modalities   Reassure Informing;Quiet presence   Therapeutic Modalities Intentional therapeutic touch;Music   Patient Behaviors Altered mental status   Neuro   Neurologic State Unresponsive   Orientation Level Unable to verbalize   Cognition Unable to assess (comment)   Speech Intubated   Assessment L Pupil Fixed;Dilated   Size L Pupil (mm) 6   Assessment R Pupil Fixed;Dilated   Size R Pupil (mm) 6   LUE Motor Response Flaccid   LLE Motor Response Flaccid   RUE Motor Response Flaccid   RLE Motor Response Flaccid   Natalya Coma Scale   Eye Opening 1   Best Verbal Response 1   Best Motor Response 1   Cable Coma Scale Score 3   OPO Notified Yes   RASS    Mai Agitation Sedation Scale (RASS) -5   Target RASS 0   Confusion Assessment Method-ICU (CAM-ICU)   Feature 1: Acute Onset or Fluctuating Course Negative   Feature 3: Altered Level of Consciousness Positive   Overall CAM-ICU Negative   Cranial Nerves   Eye Assessment Does not focus with eyes; Does not track with eyes   Eye Opening Never   Cranial Nerve Assessments Absent cough   EENT   EENT (WDL) X   Throat/Oral Cavity Other (comment)  (ETT & OGT)   Cardiac   Cardiac (WDL) WDL   B/P Outside of Defined Limits Yes   Cardiac Regularity Regular   Cardiac Rhythm Sinus Tachy   Heart Sounds S1 S2   ME Interval 0.16   QT Interval 0.32   QRS Length 0.1   RR 0.54   Cardiac/Telemetry   Cardiac/Telemetry Monitor On Yes   Box Number [id number]   Alarm Audible Yes   Alarms Set Yes   Respiratory   Respiratory (WDL) X   Patient on Vent Yes - If patient is on vent, add Doc Flowsheet Ventilator ().    Respiratory Pattern Regular   Chest/Tracheal Assessment Chest expansion, symmetrical   Breath Sounds Bilateral Clear   Airway - Endotracheal Tube 01/11/23 Oral   Placement Date: 01/11/23   Location: Oral  Airway Types: Endotracheal, cuffed  Airway Tube Size: 7.5 mm  Anesthesia ETT Insertion Depth: 24 cm  Anesthesia ETT Line Chidi: Teeth Insertion Depth (cm) 24 cm   Line Chidi Lips   Side Secured Centered;Device   Site Assessment Clean, dry, & intact   Oxygen Therapy   O2 Sat (%) 100 %   Pulse via Oximetry 111 beats per minute   O2 Device Endotracheal tube;Ventilator   FIO2 (%) 100 %   Age Specific Ventilator Associated Pneumonia Bundle   Patient Age Group Adult   Adult Ventilator Associated Pneumonia Bundle   Elevation of Head to 30-45 Degrees (Unless Contraindicated) Yes   Daily Sedation Vacation Not applicable   Assessment of Readiness to Extubate No Vent Dependent   Peptic Ulcer Disease (PUD) Prophylaxis Yes   Mechanical VTE Orders Yes   Abdominal    Abdominal (WDL) X   Abdominal Assessment Obese;Semi-soft   Appetite NPO   Bowel Sounds Hypoactive    Genitourinary   Genitourinary (WDL) X   Urine Assessment   Urinary Status Pagan;Draining   Urine Color Yellow/straw   Urine Appearance Clear   Urinary Catheter 01/11/23 Pagan - Temperature   Placement Date/Time: 01/11/23 1900   Inserted By: ED STAFF  2nd Staff Assisting: ED STAFF TOO  Type: Pagan - Temperature  Balloon Size: 10 ml   Indications for Use Need for fluid volume management of the critically ill patient in a critical care setting   Status Draining;Patent   Site Condition No abnormalities   Catheter Care Completed Yes   Drainage Tube Clipped to Bed Yes   Catheter Secured to Thigh Yes   Tamper Seal Intact Yes   Bag Below Bladder/Not on Floor Yes   Lack of Dependent Loop in Tubing Yes   Drainage Bag Less Than Half Full Yes   Sterile Solution Used for  Irrigation N/A   Urine Output (mL) 60 ml   VTE Prophylaxis (Shift Required Documentation)   Mechanical VTE Orders Yes   Sequential/Intermittent Compression Device Right lower extremity   Peripheral Vascular   Peripheral Vascular (WDL) X   LUE Peripheral Vascular   Capillary Refill Less than/equal to 3 seconds   Color Appropriate for race   Temperature Warm   Sensation Absent   Radial Pulse   (Art line)   LLE Peripheral Vascular    Capillary Refill Less than/equal to 3 seconds   Color Appropriate for race   Temperature Warm   Sensation Absent   Pedal Pulse Palpable   RUE Peripheral Vascular    Capillary Refill Less than/equal to 3 seconds   Color Appropriate for race   Temperature Warm   Sensation Absent   Radial Pulse Palpable   RLE Peripheral Vascular    Capillary Refill Less than/equal to 3 seconds   Color Appropriate for race   Temperature Warm   Sensation Absent   Post-tibial Pulse Palpable   Pedal Pulse Palpable   Edema   LUE 2+   LLE 2+   RUE 2+   RLE 2+   Wallace Scale    Sensory Perception 1   Sensory Interventions Assess changes in LOC; Assess need for specialty bed; Avoid rigorous massage over bony prominences; Check visual cues for pain;Discuss PT/OT consult with provider;Float heels;Keep linens dry and wrinkle-free;Maintain/enhance activity level;Minimize linen layers;Monitor skin under medical devices; Pad between skin to skin;Pressure redistribution bed/mattress (bed type); Use 30-degree side-lying position; Turn and reposition approx. every two hours (pillows and wedges if needed)   Moisture 4   Activity 1   Activity Interventions Assess need for specialty bed;Pressure redistribution bed/mattress(bed type)   Mobility 1   Mobility Interventions Assess need for specialty bed;Float heels;HOB 30 degrees or less;Pressure redistribution bed/mattress (bed type)   Nutrition 1   Nutrition Interventions Document food/fluid/supplement intake   Friction and Shear 1   Friction and Shear Interventions Apply protective barrier, creams and emollients; Feet elevated on foot rest;Foam dressings/transparent film/skin sealants;HOB 30 degrees or less;Lift sheet;Lift team/patient mobility team;Minimize layers;Transfer aides:transfer board/Courtney lift/ceiling lift;Transferring/repositioning devices   Wallace Score 9   [place] Fall Risk   History of Falling 25   Secondary Diagnosis and/or Comorbidity 15   Ambulatory Aids 0   Intravenous Therapy/Heparin/Saline Lock 20 Gait/Transferring 0   Mental Status 15   [place] Total Score 75   [place] Fall Risk High (45 and higher)   Fall Risk Interventions   Nursing Judgement-Fall Risk High(Add Comments) No   Toilet Every 2 Hours-In Advance of Need Yes   Hourly Visual Checks In bed;Quiet   Fall Visual Posted Armband   Room Door Open Yes   Alarm On Bed   Patient Moved Closer to Nursing Station No   Neuro   Neuro (WDL) X   Musculoskeletal   Musculoskeletal (WDL) X   LUE Swelling   LLE Swelling   RUE Swelling   RLE Swelling

## 2023-01-19 NOTE — ANESTHESIA POSTPROCEDURE EVALUATION
Procedure(s):  ORGAN HARVEST MULTIPLE. general    Anesthesia Post Evaluation        Patient participation: complete - patient cannot participate  Level of consciousness: obtunded/minimal responses  Pain management: satisfactory to patient  Airway patency: patent  Anesthetic complications: no  Cardiovascular status: acceptable  Respiratory status: acceptable and ETT  Hydration status: acceptable  Post anesthesia nausea and vomiting:  controlled  Final Post Anesthesia Temperature Assessment:  Normothermia (36.0-37.5 degrees C)      INITIAL Post-op Vital signs: No vitals data found for the desired time range. Pt placed on vent setting per surgeon.  ASA 6 pt

## 2023-01-21 LAB
BACTERIA SPEC CULT: NORMAL
BACTERIA SPEC CULT: NORMAL
SERVICE CMNT-IMP: NORMAL
SERVICE CMNT-IMP: NORMAL
